# Patient Record
Sex: FEMALE | Race: WHITE | NOT HISPANIC OR LATINO
[De-identification: names, ages, dates, MRNs, and addresses within clinical notes are randomized per-mention and may not be internally consistent; named-entity substitution may affect disease eponyms.]

---

## 2017-09-26 ENCOUNTER — LABORATORY RESULT (OUTPATIENT)
Age: 57
End: 2017-09-26

## 2017-09-26 ENCOUNTER — APPOINTMENT (OUTPATIENT)
Dept: ENDOCRINOLOGY | Facility: CLINIC | Age: 57
End: 2017-09-26
Payer: COMMERCIAL

## 2017-09-26 VITALS
SYSTOLIC BLOOD PRESSURE: 150 MMHG | BODY MASS INDEX: 32.39 KG/M2 | HEIGHT: 60 IN | DIASTOLIC BLOOD PRESSURE: 90 MMHG | WEIGHT: 165 LBS | HEART RATE: 78 BPM

## 2017-09-26 VITALS
SYSTOLIC BLOOD PRESSURE: 140 MMHG | BODY MASS INDEX: 32.39 KG/M2 | WEIGHT: 165 LBS | HEIGHT: 60 IN | HEART RATE: 78 BPM | DIASTOLIC BLOOD PRESSURE: 70 MMHG

## 2017-09-26 PROCEDURE — 99214 OFFICE O/P EST MOD 30 MIN: CPT | Mod: 25

## 2017-09-26 PROCEDURE — 36415 COLL VENOUS BLD VENIPUNCTURE: CPT

## 2017-09-26 RX ORDER — ALBUTEROL SULFATE 108 UG/1
108 (90 BASE) AEROSOL, METERED RESPIRATORY (INHALATION)
Refills: 0 | Status: ACTIVE | COMMUNITY

## 2017-10-02 ENCOUNTER — MOBILE ON CALL (OUTPATIENT)
Age: 57
End: 2017-10-02

## 2017-10-02 LAB
25(OH)D3 SERPL-MCNC: 26.4 NG/ML
ALBUMIN SERPL ELPH-MCNC: 4.6 G/DL
ALP BLD-CCNC: 74 U/L
ALT SERPL-CCNC: 15 U/L
ANA PAT FLD IF-IMP: ABNORMAL
ANA PATTERN: ABNORMAL
ANA SER IF-ACNC: ABNORMAL
ANA TITER: ABNORMAL
ANION GAP SERPL CALC-SCNC: 16 MMOL/L
AST SERPL-CCNC: 17 U/L
BASOPHILS # BLD AUTO: 0.02 K/UL
BASOPHILS NFR BLD AUTO: 0.3 %
BILIRUB SERPL-MCNC: 0.5 MG/DL
BUN SERPL-MCNC: 13 MG/DL
CALCIUM SERPL-MCNC: 9.7 MG/DL
CHLORIDE SERPL-SCNC: 99 MMOL/L
CHOLEST SERPL-MCNC: 240 MG/DL
CHOLEST/HDLC SERPL: 4 RATIO
CO2 SERPL-SCNC: 25 MMOL/L
CORTIS SERPL-MCNC: 3 UG/DL
CREAT SERPL-MCNC: 0.82 MG/DL
EOSINOPHIL # BLD AUTO: 0.08 K/UL
EOSINOPHIL NFR BLD AUTO: 1.1 %
FSH SERPL-MCNC: 76.9 IU/L
GLUCOSE SERPL-MCNC: 92 MG/DL
HBA1C MFR BLD HPLC: 5.2 %
HCT VFR BLD CALC: 43.9 %
HDLC SERPL-MCNC: 60 MG/DL
HGB BLD-MCNC: 14.6 G/DL
IMM GRANULOCYTES NFR BLD AUTO: 0.3 %
INSULIN P FAST SERPL-ACNC: 9.5 UU/ML
LDLC SERPL CALC-MCNC: 147 MG/DL
LH SERPL-ACNC: 42.7 IU/L
LYMPHOCYTES # BLD AUTO: 2.07 K/UL
LYMPHOCYTES NFR BLD AUTO: 27.3 %
MAN DIFF?: NORMAL
MCHC RBC-ENTMCNC: 29.8 PG
MCHC RBC-ENTMCNC: 33.3 GM/DL
MCV RBC AUTO: 89.6 FL
MONOCYTES # BLD AUTO: 0.51 K/UL
MONOCYTES NFR BLD AUTO: 6.7 %
NEUTROPHILS # BLD AUTO: 4.87 K/UL
NEUTROPHILS NFR BLD AUTO: 64.3 %
PLATELET # BLD AUTO: 224 K/UL
POTASSIUM SERPL-SCNC: 4.4 MMOL/L
PROLACTIN SERPL-MCNC: 5.1 NG/ML
PROT SERPL-MCNC: 7.7 G/DL
RBC # BLD: 4.9 M/UL
RBC # FLD: 14 %
SODIUM SERPL-SCNC: 140 MMOL/L
T4 FREE SERPL-MCNC: 1.3 NG/DL
T4 SERPL-MCNC: 8.4 UG/DL
TESTOST BND SERPL-MCNC: <0.2 PG/ML
TESTOST SERPL-MCNC: 7.7 NG/DL
THYROGLOB AB SERPL-ACNC: 112 IU/ML
THYROPEROXIDASE AB SERPL IA-ACNC: 62.7 IU/ML
TRIGL SERPL-MCNC: 166 MG/DL
TSH SERPL-ACNC: 2.84 UIU/ML
WBC # FLD AUTO: 7.57 K/UL

## 2017-10-03 ENCOUNTER — RX RENEWAL (OUTPATIENT)
Age: 57
End: 2017-10-03

## 2018-02-06 ENCOUNTER — APPOINTMENT (OUTPATIENT)
Dept: ENDOCRINOLOGY | Facility: CLINIC | Age: 58
End: 2018-02-06
Payer: COMMERCIAL

## 2018-02-06 VITALS
HEART RATE: 84 BPM | WEIGHT: 172 LBS | DIASTOLIC BLOOD PRESSURE: 80 MMHG | HEIGHT: 59.5 IN | BODY MASS INDEX: 34.22 KG/M2 | SYSTOLIC BLOOD PRESSURE: 140 MMHG

## 2018-02-06 PROCEDURE — 99214 OFFICE O/P EST MOD 30 MIN: CPT

## 2018-03-06 ENCOUNTER — RX RENEWAL (OUTPATIENT)
Age: 58
End: 2018-03-06

## 2018-03-09 ENCOUNTER — RX RENEWAL (OUTPATIENT)
Age: 58
End: 2018-03-09

## 2018-03-16 ENCOUNTER — MEDICATION RENEWAL (OUTPATIENT)
Age: 58
End: 2018-03-16

## 2018-04-24 ENCOUNTER — APPOINTMENT (OUTPATIENT)
Dept: ENDOCRINOLOGY | Facility: CLINIC | Age: 58
End: 2018-04-24
Payer: COMMERCIAL

## 2018-04-24 VITALS
OXYGEN SATURATION: 97 % | BODY MASS INDEX: 34.02 KG/M2 | WEIGHT: 171 LBS | HEIGHT: 59.5 IN | HEART RATE: 78 BPM | SYSTOLIC BLOOD PRESSURE: 122 MMHG | DIASTOLIC BLOOD PRESSURE: 85 MMHG

## 2018-04-24 PROCEDURE — 99214 OFFICE O/P EST MOD 30 MIN: CPT

## 2018-05-22 ENCOUNTER — APPOINTMENT (OUTPATIENT)
Dept: ENDOCRINOLOGY | Facility: CLINIC | Age: 58
End: 2018-05-22
Payer: COMMERCIAL

## 2018-05-22 VITALS
HEART RATE: 68 BPM | WEIGHT: 178.5 LBS | OXYGEN SATURATION: 98 % | SYSTOLIC BLOOD PRESSURE: 133 MMHG | HEIGHT: 59.5 IN | DIASTOLIC BLOOD PRESSURE: 83 MMHG | BODY MASS INDEX: 35.51 KG/M2

## 2018-05-22 PROCEDURE — 99214 OFFICE O/P EST MOD 30 MIN: CPT

## 2018-05-24 ENCOUNTER — APPOINTMENT (OUTPATIENT)
Dept: ENDOCRINOLOGY | Facility: CLINIC | Age: 58
End: 2018-05-24

## 2018-06-15 ENCOUNTER — RX RENEWAL (OUTPATIENT)
Age: 58
End: 2018-06-15

## 2018-06-22 ENCOUNTER — APPOINTMENT (OUTPATIENT)
Dept: ENDOCRINOLOGY | Facility: CLINIC | Age: 58
End: 2018-06-22

## 2018-07-16 ENCOUNTER — APPOINTMENT (OUTPATIENT)
Dept: ENDOCRINOLOGY | Facility: CLINIC | Age: 58
End: 2018-07-16

## 2018-08-02 ENCOUNTER — APPOINTMENT (OUTPATIENT)
Dept: ENDOCRINOLOGY | Facility: CLINIC | Age: 58
End: 2018-08-02
Payer: COMMERCIAL

## 2018-08-02 VITALS
SYSTOLIC BLOOD PRESSURE: 117 MMHG | DIASTOLIC BLOOD PRESSURE: 76 MMHG | HEIGHT: 59.5 IN | OXYGEN SATURATION: 98 % | HEART RATE: 79 BPM | WEIGHT: 160 LBS | BODY MASS INDEX: 31.83 KG/M2

## 2018-08-02 DIAGNOSIS — Z87.891 PERSONAL HISTORY OF NICOTINE DEPENDENCE: ICD-10-CM

## 2018-08-02 DIAGNOSIS — Z78.9 OTHER SPECIFIED HEALTH STATUS: ICD-10-CM

## 2018-08-02 LAB
GLUCOSE BLDC GLUCOMTR-MCNC: 93
HBA1C MFR BLD HPLC: 5.1

## 2018-08-02 PROCEDURE — 36415 COLL VENOUS BLD VENIPUNCTURE: CPT

## 2018-08-02 PROCEDURE — 82962 GLUCOSE BLOOD TEST: CPT

## 2018-08-02 PROCEDURE — 83036 HEMOGLOBIN GLYCOSYLATED A1C: CPT | Mod: QW

## 2018-08-02 PROCEDURE — 99214 OFFICE O/P EST MOD 30 MIN: CPT | Mod: 25

## 2018-08-08 LAB
25(OH)D3 SERPL-MCNC: 29 NG/ML
ALBUMIN SERPL ELPH-MCNC: 4.7 G/DL
ALP BLD-CCNC: 72 U/L
ALT SERPL-CCNC: 12 U/L
ANION GAP SERPL CALC-SCNC: 11 MMOL/L
AST SERPL-CCNC: 16 U/L
BASOPHILS # BLD AUTO: 0.02 K/UL
BASOPHILS NFR BLD AUTO: 0.3 %
BILIRUB SERPL-MCNC: 0.5 MG/DL
BUN SERPL-MCNC: 15 MG/DL
CALCIUM SERPL-MCNC: 9.8 MG/DL
CHLORIDE SERPL-SCNC: 104 MMOL/L
CHOLEST SERPL-MCNC: 197 MG/DL
CHOLEST/HDLC SERPL: 3.6 RATIO
CO2 SERPL-SCNC: 26 MMOL/L
CREAT SERPL-MCNC: 0.9 MG/DL
EOSINOPHIL # BLD AUTO: 0.08 K/UL
EOSINOPHIL NFR BLD AUTO: 1.2 %
GLUCOSE SERPL-MCNC: 86 MG/DL
HCT VFR BLD CALC: 45.3 %
HDLC SERPL-MCNC: 55 MG/DL
HGB BLD-MCNC: 14.9 G/DL
IMM GRANULOCYTES NFR BLD AUTO: 0.3 %
LDLC SERPL CALC-MCNC: 116 MG/DL
LYMPHOCYTES # BLD AUTO: 1.53 K/UL
LYMPHOCYTES NFR BLD AUTO: 22.6 %
MAN DIFF?: NORMAL
MCHC RBC-ENTMCNC: 29 PG
MCHC RBC-ENTMCNC: 32.9 GM/DL
MCV RBC AUTO: 88.3 FL
MONOCYTES # BLD AUTO: 0.42 K/UL
MONOCYTES NFR BLD AUTO: 6.2 %
NEUTROPHILS # BLD AUTO: 4.7 K/UL
NEUTROPHILS NFR BLD AUTO: 69.4 %
PLATELET # BLD AUTO: 231 K/UL
POTASSIUM SERPL-SCNC: 5.4 MMOL/L
PROT SERPL-MCNC: 7.3 G/DL
RBC # BLD: 5.13 M/UL
RBC # FLD: 14.5 %
SAVE SPECIMEN: NORMAL
SODIUM SERPL-SCNC: 141 MMOL/L
T4 FREE SERPL-MCNC: 1.2 NG/DL
T4 SERPL-MCNC: 9.2 UG/DL
TRIGL SERPL-MCNC: 128 MG/DL
TSH SERPL-ACNC: 3.1 UIU/ML
WBC # FLD AUTO: 6.77 K/UL

## 2018-08-24 ENCOUNTER — RX RENEWAL (OUTPATIENT)
Age: 58
End: 2018-08-24

## 2018-11-05 ENCOUNTER — RX RENEWAL (OUTPATIENT)
Age: 58
End: 2018-11-05

## 2018-11-09 ENCOUNTER — MEDICATION RENEWAL (OUTPATIENT)
Age: 58
End: 2018-11-09

## 2018-12-05 ENCOUNTER — APPOINTMENT (OUTPATIENT)
Dept: ENDOCRINOLOGY | Facility: CLINIC | Age: 58
End: 2018-12-05

## 2018-12-10 ENCOUNTER — RX RENEWAL (OUTPATIENT)
Age: 58
End: 2018-12-10

## 2019-01-18 ENCOUNTER — APPOINTMENT (OUTPATIENT)
Dept: ENDOCRINOLOGY | Facility: CLINIC | Age: 59
End: 2019-01-18
Payer: COMMERCIAL

## 2019-01-18 VITALS
DIASTOLIC BLOOD PRESSURE: 85 MMHG | OXYGEN SATURATION: 96 % | WEIGHT: 155 LBS | HEIGHT: 59.5 IN | BODY MASS INDEX: 30.84 KG/M2 | HEART RATE: 76 BPM | SYSTOLIC BLOOD PRESSURE: 150 MMHG

## 2019-01-18 VITALS — SYSTOLIC BLOOD PRESSURE: 132 MMHG | DIASTOLIC BLOOD PRESSURE: 82 MMHG

## 2019-01-18 PROCEDURE — 36415 COLL VENOUS BLD VENIPUNCTURE: CPT

## 2019-01-18 PROCEDURE — 99214 OFFICE O/P EST MOD 30 MIN: CPT | Mod: 25

## 2019-01-18 NOTE — ASSESSMENT
[FreeTextEntry1] : Patient with adult onset obesity. We discussed importance of diet and exercise. At this time, she will increase Saxenda to 3mg daily. We reviewed injection technique and site rotation with the patient. I have explained the importance of proper injection to the patient. She will avoid that area for a few weeks.  We discussed at length the importance of following a carbohydrate balanced diet and the importance of incorporating protein in meals. We also discussed appropriate alternative food choices. We also discussed the importance of exercise.\par She will remain on Synthroid 25 mcg daily. A fasting blood test was done today. Patient is requesting to check her JONNY as she is due to see the Rheumatologist. She will f/u in 3 months.\par \par  [Carbohydrate Consistent Diet] : carbohydrate consistent diet [Importance of Diet and Exercise] : importance of diet and exercise to improve glycemic control, achieve weight loss and improve cardiovascular health [Incretin Mimetic Therapy] : Risks and benefits of incretin mimetic therapy were discussed with the patient including nauseau, pancreatitis and potential risk of medullary thyroid cancer [Levothyroxine] : The patient was instructed to take Levothyroxine on an empty stomach, separate from vitamins, and wait at least 30 minutes before eating

## 2019-01-18 NOTE — HISTORY OF PRESENT ILLNESS
[FreeTextEntry1] : Patient with adult onset obesity. She is on Saxenda 2.4 mg daily. She reports daily compliance with her medication. She does have one abdominal lump from the Saxenda. She has lost 5 lbs since her last visit in August. She has been more sedentary due to an injury with her foot when she fell at home. She does report she is watching her diet but did struggle during the holidays. She is planning to start exercising again. \par Patient with h/o Hashimoto's, she has been taking Levothyroxine 25 mcg daily. She does have a history of thyroid nodules and will be due for a repeat thyroid sono in May. \par \par \par

## 2019-01-18 NOTE — PHYSICAL EXAM
[Alert] : alert [No Acute Distress] : no acute distress [Well Nourished] : well nourished [Well Developed] : well developed [Normal Sclera/Conjunctiva] : normal sclera/conjunctiva [EOMI] : extra ocular movement intact [No Proptosis] : no proptosis [Normal Oropharynx] : the oropharynx was normal [Thyroid Not Enlarged] : the thyroid was not enlarged [No Thyroid Nodules] : there were no palpable thyroid nodules [No Respiratory Distress] : no respiratory distress [No Accessory Muscle Use] : no accessory muscle use [Clear to Auscultation] : lungs were clear to auscultation bilaterally [Normal Rate] : heart rate was normal  [Normal S1, S2] : normal S1 and S2 [Regular Rhythm] : with a regular rhythm [Pedal Pulses Normal] : the pedal pulses are present [No Edema] : there was no peripheral edema [Normal Bowel Sounds] : normal bowel sounds [Not Tender] : non-tender [Soft] : abdomen soft [Not Distended] : not distended [No Spinal Tenderness] : no spinal tenderness [Spine Straight] : spine straight [Normal Gait] : normal gait [Normal Strength/Tone] : muscle strength and tone were normal [No Rash] : no rash [Acanthosis Nigricans] : no acanthosis nigricans [Normal Reflexes] : deep tendon reflexes were 2+ and symmetric [No Tremors] : no tremors [Oriented x3] : oriented to person, place, and time [de-identified] : RLQ marble sized lump

## 2019-01-28 ENCOUNTER — MEDICATION RENEWAL (OUTPATIENT)
Age: 59
End: 2019-01-28

## 2019-01-28 LAB
25(OH)D3 SERPL-MCNC: 21.9 NG/ML
ALBUMIN SERPL ELPH-MCNC: 4.8 G/DL
ALP BLD-CCNC: 79 U/L
ALT SERPL-CCNC: 14 U/L
ANA PAT FLD IF-IMP: ABNORMAL
ANA SER IF-ACNC: ABNORMAL
ANION GAP SERPL CALC-SCNC: 13 MMOL/L
AST SERPL-CCNC: 15 U/L
BASOPHILS # BLD AUTO: 0.02 K/UL
BASOPHILS NFR BLD AUTO: 0.3 %
BILIRUB SERPL-MCNC: 0.4 MG/DL
BUN SERPL-MCNC: 14 MG/DL
CALCIUM SERPL-MCNC: 9.5 MG/DL
CHLORIDE SERPL-SCNC: 104 MMOL/L
CHOLEST SERPL-MCNC: 191 MG/DL
CHOLEST/HDLC SERPL: 2.9 RATIO
CO2 SERPL-SCNC: 24 MMOL/L
CREAT SERPL-MCNC: 0.98 MG/DL
EOSINOPHIL # BLD AUTO: 0.06 K/UL
EOSINOPHIL NFR BLD AUTO: 1 %
GLUCOSE SERPL-MCNC: 94 MG/DL
HCT VFR BLD CALC: 45.5 %
HDLC SERPL-MCNC: 66 MG/DL
HGB BLD-MCNC: 15.2 G/DL
IMM GRANULOCYTES NFR BLD AUTO: 0.3 %
LDLC SERPL CALC-MCNC: 98 MG/DL
LYMPHOCYTES # BLD AUTO: 1.73 K/UL
LYMPHOCYTES NFR BLD AUTO: 29.2 %
MAN DIFF?: NORMAL
MCHC RBC-ENTMCNC: 29.7 PG
MCHC RBC-ENTMCNC: 33.4 GM/DL
MCV RBC AUTO: 88.9 FL
MONOCYTES # BLD AUTO: 0.41 K/UL
MONOCYTES NFR BLD AUTO: 6.9 %
NEUTROPHILS # BLD AUTO: 3.69 K/UL
NEUTROPHILS NFR BLD AUTO: 62.3 %
PLATELET # BLD AUTO: 238 K/UL
POTASSIUM SERPL-SCNC: 4.5 MMOL/L
PROT SERPL-MCNC: 7.6 G/DL
RBC # BLD: 5.12 M/UL
RBC # FLD: 14 %
SAVE SPECIMEN: NORMAL
SODIUM SERPL-SCNC: 141 MMOL/L
T4 FREE SERPL-MCNC: 1.2 NG/DL
T4 SERPL-MCNC: 8.4 UG/DL
TRIGL SERPL-MCNC: 133 MG/DL
TSH SERPL-ACNC: 2.88 UIU/ML
WBC # FLD AUTO: 5.93 K/UL

## 2019-01-28 RX ORDER — PEN NEEDLE, DIABETIC 32 GX 1/6"
32G X 4 MM NEEDLE, DISPOSABLE MISCELLANEOUS
Qty: 100 | Refills: 1 | Status: DISCONTINUED | COMMUNITY
Start: 2018-05-21 | End: 2019-01-28

## 2019-01-28 RX ORDER — PEN NEEDLE, DIABETIC 32 GX 1/4"
32G X 6 MM NEEDLE, DISPOSABLE MISCELLANEOUS
Qty: 100 | Refills: 2 | Status: DISCONTINUED | COMMUNITY
Start: 2018-11-09 | End: 2019-01-28

## 2019-03-05 ENCOUNTER — APPOINTMENT (OUTPATIENT)
Dept: ENDOCRINOLOGY | Facility: CLINIC | Age: 59
End: 2019-03-05
Payer: COMMERCIAL

## 2019-03-05 VITALS
DIASTOLIC BLOOD PRESSURE: 84 MMHG | OXYGEN SATURATION: 94 % | HEIGHT: 59.5 IN | HEART RATE: 73 BPM | BODY MASS INDEX: 31.08 KG/M2 | SYSTOLIC BLOOD PRESSURE: 141 MMHG | WEIGHT: 156.25 LBS

## 2019-03-05 PROCEDURE — 99215 OFFICE O/P EST HI 40 MIN: CPT

## 2019-03-05 NOTE — ASSESSMENT
[FreeTextEntry1] : Patient with a history of Hashimoto's having and is on levothyroxine 25 ug remain on 9 pills weekly.. She is doing great on low dose saxenda. Will check labs today, She will try to incorporate exercise into her daily routine.\par Her BD in 2017 showed significant osteoporosis. Will will do labs to r/o secondary cause of bone loss and then discuss therapy.  [Carbohydrate Consistent Diet] : carbohydrate consistent diet [Importance of Diet and Exercise] : importance of diet and exercise to improve glycemic control, achieve weight loss and improve cardiovascular health [Incretin Mimetic Therapy] : Risks and benefits of incretin mimetic therapy were discussed with the patient including nauseau, pancreatitis and potential risk of medullary thyroid cancer

## 2019-03-05 NOTE — HISTORY OF PRESENT ILLNESS
[FreeTextEntry1] : Patient with a history of Hashimoto's thyroid nodules.  She was originally started on Synthroid and has been compliant recently.Her main problrm has to do with weight gain and fatigue. She has tried many diets and does not seem to lose weight and keep it off. She is on medication for high cholesterol. She does notice some intermittent hoarseness. She recently has been back on levothyroxine and feels better. She has been on 3 mg of saxenda and has been losing weight; she also feels much better.

## 2019-03-05 NOTE — PHYSICAL EXAM
[Alert] : alert [No Acute Distress] : no acute distress [Well Nourished] : well nourished [Well Developed] : well developed [Normal Sclera/Conjunctiva] : normal sclera/conjunctiva [EOMI] : extra ocular movement intact [No Proptosis] : no proptosis [Normal Oropharynx] : the oropharynx was normal [Thyroid Not Enlarged] : the thyroid was not enlarged [No Thyroid Nodules] : there were no palpable thyroid nodules [No Respiratory Distress] : no respiratory distress [No Accessory Muscle Use] : no accessory muscle use [Clear to Auscultation] : lungs were clear to auscultation bilaterally [Normal Rate] : heart rate was normal  [Normal S1, S2] : normal S1 and S2 [Regular Rhythm] : with a regular rhythm [Pedal Pulses Normal] : the pedal pulses are present [No Edema] : there was no peripheral edema [Normal Bowel Sounds] : normal bowel sounds [Not Tender] : non-tender [Soft] : abdomen soft [Not Distended] : not distended [Post Cervical Nodes] : posterior cervical nodes [Anterior Cervical Nodes] : anterior cervical nodes [Axillary Nodes] : axillary nodes [Normal] : normal and non tender [No Spinal Tenderness] : no spinal tenderness [Spine Straight] : spine straight [Normal Gait] : normal gait [Normal Strength/Tone] : muscle strength and tone were normal [No Rash] : no rash [Normal Reflexes] : deep tendon reflexes were 2+ and symmetric [No Tremors] : no tremors [Oriented x3] : oriented to person, place, and time [Acanthosis Nigricans] : no acanthosis nigricans

## 2019-03-06 ENCOUNTER — LABORATORY RESULT (OUTPATIENT)
Age: 59
End: 2019-03-06

## 2019-05-09 ENCOUNTER — APPOINTMENT (OUTPATIENT)
Dept: ENDOCRINOLOGY | Facility: CLINIC | Age: 59
End: 2019-05-09
Payer: COMMERCIAL

## 2019-05-09 VITALS
BODY MASS INDEX: 31.63 KG/M2 | SYSTOLIC BLOOD PRESSURE: 144 MMHG | WEIGHT: 159 LBS | HEART RATE: 74 BPM | HEIGHT: 59.5 IN | DIASTOLIC BLOOD PRESSURE: 79 MMHG | OXYGEN SATURATION: 97 %

## 2019-05-09 PROCEDURE — 99214 OFFICE O/P EST MOD 30 MIN: CPT

## 2019-06-12 NOTE — ASSESSMENT
[Carbohydrate Consistent Diet] : carbohydrate consistent diet [Levothyroxine] : The patient was instructed to take Levothyroxine on an empty stomach, separate from vitamins, and wait at least 30 minutes before eating [Importance of Diet and Exercise] : importance of diet and exercise to improve glycemic control, achieve weight loss and improve cardiovascular health [FreeTextEntry1] : Patient with a history of Hashimoto's having and is on levothyroxine 25 ug remain on 9 pills weekly.. She is doing great on low dose saxenda. Will check labs today, She will try to incorporate exercise into her daily routine.\par \par \par Her BD 3/2019 showed significant osteoporosis T score - 4.3 , high risk for fracture. .Labs were reviewed and were normal. recommend Forteo due to significant osteoporosis. Will send to pharmacy. \par Recommend Forteo for 2 years followed by Reclast. Oral bisphosphonates contraindicated due to pharyngeal dysphagia.

## 2019-06-12 NOTE — PHYSICAL EXAM
[Alert] : alert [No Acute Distress] : no acute distress [Well Nourished] : well nourished [Well Developed] : well developed [Normal Sclera/Conjunctiva] : normal sclera/conjunctiva [EOMI] : extra ocular movement intact [Normal Oropharynx] : the oropharynx was normal [No Proptosis] : no proptosis [Thyroid Not Enlarged] : the thyroid was not enlarged [No Thyroid Nodules] : there were no palpable thyroid nodules [No Respiratory Distress] : no respiratory distress [No Accessory Muscle Use] : no accessory muscle use [Normal Rate] : heart rate was normal  [Clear to Auscultation] : lungs were clear to auscultation bilaterally [Normal S1, S2] : normal S1 and S2 [Regular Rhythm] : with a regular rhythm [No Edema] : there was no peripheral edema [Pedal Pulses Normal] : the pedal pulses are present [Normal Bowel Sounds] : normal bowel sounds [Not Tender] : non-tender [Soft] : abdomen soft [Post Cervical Nodes] : posterior cervical nodes [Not Distended] : not distended [Anterior Cervical Nodes] : anterior cervical nodes [Axillary Nodes] : axillary nodes [Normal] : normal and non tender [Spine Straight] : spine straight [No Spinal Tenderness] : no spinal tenderness [Normal Gait] : normal gait [No Rash] : no rash [Normal Strength/Tone] : muscle strength and tone were normal [Normal Reflexes] : deep tendon reflexes were 2+ and symmetric [No Tremors] : no tremors [Oriented x3] : oriented to person, place, and time [Acanthosis Nigricans] : no acanthosis nigricans

## 2019-06-12 NOTE — HISTORY OF PRESENT ILLNESS
[FreeTextEntry1] : Patient with a history of Hashimoto's thyroid nodules.  She was originally started on Synthroid and has been compliant recently.Her main problem has to do with weight gain and fatigue. She has tried many diets and does not seem to lose weight and keep it off. She is on medication for high cholesterol. She does notice some intermittent hoarseness. She recently has been back on levothyroxine and feels better. She has been on 3 mg of saxenda and has been losing weight; she also feels much better. She is here to go over recent lab work for osteoporosis.

## 2019-07-29 ENCOUNTER — RX RENEWAL (OUTPATIENT)
Age: 59
End: 2019-07-29

## 2019-10-02 PROBLEM — Z78.9 PATIENT CONSUMER CAFFEINATED COFFEE: Status: ACTIVE | Noted: 2017-09-26

## 2019-10-19 ENCOUNTER — MOBILE ON CALL (OUTPATIENT)
Age: 59
End: 2019-10-19

## 2019-11-07 ENCOUNTER — APPOINTMENT (OUTPATIENT)
Dept: PEDIATRICS | Facility: CLINIC | Age: 59
End: 2019-11-07
Payer: SELF-PAY

## 2019-11-07 PROCEDURE — 90686 IIV4 VACC NO PRSV 0.5 ML IM: CPT | Mod: SL

## 2019-11-07 PROCEDURE — 90471 IMMUNIZATION ADMIN: CPT

## 2019-11-25 ENCOUNTER — RX RENEWAL (OUTPATIENT)
Age: 59
End: 2019-11-25

## 2019-12-09 LAB
25(OH)D3 SERPL-MCNC: 30.7 NG/ML
ALBUMIN SERPL ELPH-MCNC: 4.7 G/DL
ALP BLD-CCNC: 88 U/L
ALP BONE SERPL-MCNC: 14 MCG/L
ALT SERPL-CCNC: 11 U/L
ANA PAT FLD IF-IMP: ABNORMAL
ANA SER IF-ACNC: ABNORMAL
ANION GAP SERPL CALC-SCNC: 13 MMOL/L
AST SERPL-CCNC: 16 U/L
BASOPHILS # BLD AUTO: 0.04 K/UL
BASOPHILS NFR BLD AUTO: 0.6 %
BILIRUB SERPL-MCNC: 0.5 MG/DL
BUN SERPL-MCNC: 14 MG/DL
CALCIUM SERPL-MCNC: 9.8 MG/DL
CALCIUM SERPL-MCNC: 9.8 MG/DL
CHLORIDE SERPL-SCNC: 100 MMOL/L
CHOLEST SERPL-MCNC: 213 MG/DL
CHOLEST/HDLC SERPL: 3.3 RATIO
CO2 SERPL-SCNC: 25 MMOL/L
COLLAGEN CTX SERPL-MCNC: 588 PG/ML
CREAT SERPL-MCNC: 0.91 MG/DL
DEPRECATED KAPPA LC FREE/LAMBDA SER: 1.5 RATIO
DEPRECATED KAPPA LC FREE/LAMBDA SER: 1.5 RATIO
EOSINOPHIL # BLD AUTO: 0.11 K/UL
EOSINOPHIL NFR BLD AUTO: 1.7 %
ESTIMATED AVERAGE GLUCOSE: 105 MG/DL
GLUCOSE SERPL-MCNC: 100 MG/DL
HBA1C MFR BLD HPLC: 5.3 %
HCT VFR BLD CALC: 46.2 %
HDLC SERPL-MCNC: 64 MG/DL
HGB BLD-MCNC: 15.8 G/DL
IGA SER QL IEP: 210 MG/DL
IGG SER QL IEP: 1350 MG/DL
IGM SER QL IEP: 107 MG/DL
IMM GRANULOCYTES NFR BLD AUTO: 0.3 %
KAPPA LC CSF-MCNC: 1.52 MG/DL
KAPPA LC CSF-MCNC: 1.52 MG/DL
KAPPA LC SERPL-MCNC: 2.28 MG/DL
KAPPA LC SERPL-MCNC: 2.28 MG/DL
LDLC SERPL CALC-MCNC: 118 MG/DL
LYMPHOCYTES # BLD AUTO: 1.96 K/UL
LYMPHOCYTES NFR BLD AUTO: 30.3 %
M PROTEIN SPEC IFE-MCNC: NORMAL
MAN DIFF?: NORMAL
MCHC RBC-ENTMCNC: 30.3 PG
MCHC RBC-ENTMCNC: 34.2 GM/DL
MCV RBC AUTO: 88.7 FL
MONOCYTES # BLD AUTO: 0.57 K/UL
MONOCYTES NFR BLD AUTO: 8.8 %
NEUTROPHILS # BLD AUTO: 3.76 K/UL
NEUTROPHILS NFR BLD AUTO: 58.3 %
PARATHYROID HORMONE INTACT: 69 PG/ML
PLATELET # BLD AUTO: 239 K/UL
POTASSIUM SERPL-SCNC: 4.3 MMOL/L
PROT SERPL-MCNC: 7.6 G/DL
RBC # BLD: 5.21 M/UL
RBC # FLD: 13.1 %
SAVE SPECIMEN: NORMAL
SODIUM SERPL-SCNC: 138 MMOL/L
T4 FREE SERPL-MCNC: 1.2 NG/DL
T4 SERPL-MCNC: 9.2 UG/DL
TRIGL SERPL-MCNC: 156 MG/DL
TSH SERPL-ACNC: 3.77 UIU/ML
WBC # FLD AUTO: 6.46 K/UL

## 2019-12-12 ENCOUNTER — APPOINTMENT (OUTPATIENT)
Dept: ENDOCRINOLOGY | Facility: CLINIC | Age: 59
End: 2019-12-12
Payer: COMMERCIAL

## 2019-12-12 PROCEDURE — 99215 OFFICE O/P EST HI 40 MIN: CPT | Mod: 25

## 2019-12-12 PROCEDURE — 36415 COLL VENOUS BLD VENIPUNCTURE: CPT

## 2019-12-12 NOTE — ASSESSMENT
[FreeTextEntry1] : 1) Hashimoto's - Patient with a history of Hashimoto's currently off  levothyroxine 25 ug .Since TFTs normal she will remain off. .\par 2) osteoporosis - Her BD 3/2019 showed significant osteoporosis T score - 4.3 , high risk for fracture. .Labs were reviewed and were normal. recommend Forteo due to significant osteoporosis. Recently approved however current labs show elevated PTH level; will repeat prior to starting.  \par Hope to start Forteo for 2 years followed by Reclast. Oral bisphosphonates contraindicated due to pharyngeal dysphagia. \par 3) Elevated kappa level - minimal elevation however her GI doctor mentioned to her ,  her spleen was enlarged; she will make appointment with hematologist/oncologist. Bring copy of report. \par 4 + JONNY to see rheumatologist \par 5) Obesity - She was doing great on low dose saxenda so she recently resumed. 0.6 daily.  [Importance of Diet and Exercise] : importance of diet and exercise to improve glycemic control, achieve weight loss and improve cardiovascular health [Incretin Mimetic Therapy] : Risks and benefits of incretin mimetic therapy were discussed with the patient including nauseau, pancreatitis and potential risk of medullary thyroid cancer [Carbohydrate Consistent Diet] : carbohydrate consistent diet

## 2019-12-12 NOTE — HISTORY OF PRESENT ILLNESS
[FreeTextEntry1] : Patient with a history of Hashimoto's thyroid nodules.  She was originally started on Synthroid and has been compliant recently.Her main problem has to do with weight gain and fatigue. She has tried many diets and does not seem to lose weight and keep it off. She recently resumed Saxenda. She is on medication for high cholesterol. She does notice some intermittent hoarseness. She recently has been back on levothyroxine and felt better but did stop 6 weeks ago. .  She does have a + JONNY but has not seen the rheumatologist. \par  She is here to go over recent lab work for osteoporosis. She was suppose to have started Forteo.

## 2019-12-16 ENCOUNTER — TRANSCRIPTION ENCOUNTER (OUTPATIENT)
Age: 59
End: 2019-12-16

## 2019-12-16 ENCOUNTER — RX RENEWAL (OUTPATIENT)
Age: 59
End: 2019-12-16

## 2019-12-17 LAB
24R-OH-CALCIDIOL SERPL-MCNC: 71.2 PG/ML
CA-I SERPL-SCNC: 1.23 MMOL/L
CALCIUM SERPL-MCNC: 10 MG/DL
CORTIS SERPL-MCNC: 2.2 UG/DL
PARATHYROID HORMONE INTACT: 55 PG/ML
SAVE SPECIMEN: NORMAL

## 2019-12-19 LAB
CORTICOSTEROID BIND GLOBULIN: 1.2 MG/DL
CORTIS SERPL-MCNC: 2 UG/DL
CORTISOL, FREE: 0.11 UG/DL
PFCX: 5.4 %

## 2020-04-29 ENCOUNTER — RX RENEWAL (OUTPATIENT)
Age: 60
End: 2020-04-29

## 2020-08-10 ENCOUNTER — RX RENEWAL (OUTPATIENT)
Age: 60
End: 2020-08-10

## 2020-09-03 ENCOUNTER — APPOINTMENT (OUTPATIENT)
Dept: ENDOCRINOLOGY | Facility: CLINIC | Age: 60
End: 2020-09-03
Payer: COMMERCIAL

## 2020-09-03 PROCEDURE — 99214 OFFICE O/P EST MOD 30 MIN: CPT | Mod: 95

## 2020-09-03 NOTE — PHYSICAL EXAM
[No Acute Distress] : no acute distress [Alert] : alert [Normal Voice/Communication] : normal voice communication [EOMI] : extra ocular movement intact [Thyroid Not Enlarged] : the thyroid was not enlarged [Normal Sclera/Conjunctiva] : normal sclera/conjunctiva [No Respiratory Distress] : no respiratory distress [No Joint Swelling] : no joint swelling seen [Normal Gait] : normal gait [Oriented x3] : oriented to person, place, and time [Normal Insight/Judgement] : insight and judgment were intact [de-identified] : Limited exam due to Telehealth visit secondary to Covid pandemic

## 2020-09-03 NOTE — REASON FOR VISIT
[Follow - Up] : a follow-up visit [Osteoporosis] : osteoporosis [Weight Management/Obesity] : weight management/obesity

## 2020-09-03 NOTE — HISTORY OF PRESENT ILLNESS
[Home] : at home, [unfilled] , at the time of the visit. [Medical Office: ([facility])___] : at the medical office located in  [Verbal consent obtained from patient] : the patient, [unfilled] [FreeTextEntry1] : Patient with a history of Hashimoto's thyroid nodules.  She was originally started on Synthroid and has been compliant recently.Her main problem has to do with weight gain and fatigue. She has tried many diets and does not seem to lose weight and keep it off. She has been off Synthroid over a year. She recently resumed Saxenda. Was off a few months. She is on medication for high cholesterol. She does notice some intermittent hoarseness.  She has a + JONNY but has not seen the rheumatologist. \par  She has  osteoporosis. She was suppose to have started Forteo but worried to start during the pandemic. she rarely goes out. .

## 2020-09-03 NOTE — ASSESSMENT
[Weight Loss] : weight loss [Importance of Diet and Exercise] : importance of diet and exercise to improve glycemic control, achieve weight loss and improve cardiovascular health [FreeTextEntry1] : 1) Hashimoto's - Patient with a history of Hashimoto's currently off  levothyroxine 25 ug .Since TFTs normal she will remain off. .to repeat labs. Has thyroid nodules due to repeat a sono. \par 2) osteoporosis - Her BD 3/2019 showed significant osteoporosis T score - 4.3 , high risk for fracture. .Labs were reviewed and were normal. recommend Forteo due to significant osteoporosis. Recently approved however .  \par Hope to start Forteo for 2 years followed by Reclast. Oral bisphosphonates contraindicated due to pharyngeal dysphagia. To repeat labs. \par 3) Elevated kappa level - minimal elevation however her GI doctor mentioned to her ,  her spleen was enlarged; she will make appointment with hematologist/oncologist. Bring copy of report. \par 4 + JONNY to see rheumatologist \par 5) Obesity - She was doing great on low dose Saxenda so she recently resumed. 0.6 daily. Will order.

## 2021-05-18 ENCOUNTER — NON-APPOINTMENT (OUTPATIENT)
Age: 61
End: 2021-05-18

## 2021-05-22 ENCOUNTER — LABORATORY RESULT (OUTPATIENT)
Age: 61
End: 2021-05-22

## 2021-06-01 ENCOUNTER — RESULT REVIEW (OUTPATIENT)
Age: 61
End: 2021-06-01

## 2021-06-03 ENCOUNTER — APPOINTMENT (OUTPATIENT)
Dept: ENDOCRINOLOGY | Facility: CLINIC | Age: 61
End: 2021-06-03
Payer: COMMERCIAL

## 2021-06-03 VITALS
SYSTOLIC BLOOD PRESSURE: 138 MMHG | HEIGHT: 59.5 IN | HEART RATE: 94 BPM | TEMPERATURE: 97.5 F | DIASTOLIC BLOOD PRESSURE: 76 MMHG | OXYGEN SATURATION: 96 %

## 2021-06-03 PROCEDURE — 99215 OFFICE O/P EST HI 40 MIN: CPT

## 2021-06-03 PROCEDURE — 99072 ADDL SUPL MATRL&STAF TM PHE: CPT

## 2021-06-03 NOTE — PHYSICAL EXAM
[Alert] : alert [No Acute Distress] : no acute distress [Normal Voice/Communication] : normal voice communication [Normal Sclera/Conjunctiva] : normal sclera/conjunctiva [EOMI] : extra ocular movement intact [Normal Outer Ear/Nose] : the ears and nose were normal in appearance [Thyroid Not Enlarged] : the thyroid was not enlarged [No Thyroid Nodules] : no palpable thyroid nodules [No Respiratory Distress] : no respiratory distress [Clear to Auscultation] : lungs were clear to auscultation bilaterally [Normal PMI] : the apical impulse was normal [Normal Rate] : heart rate was normal [Regular Rhythm] : with a regular rhythm [No Edema] : no peripheral edema [Normal Appearance] : normal in appearance [Gynecomastia] : gynecomastia present [Normal Bowel Sounds] : normal bowel sounds [Soft] : abdomen soft [Normal Gait] : normal gait [No Joint Swelling] : no joint swelling seen [No Rash] : no rash [Normal Reflexes] : deep tendon reflexes were 2+ and symmetric [No Tremors] : no tremors [Oriented x3] : oriented to person, place, and time [Normal Insight/Judgement] : insight and judgment were intact [de-identified] : fullness around right knee and thighs seem sarmiento

## 2021-06-03 NOTE — HISTORY OF PRESENT ILLNESS
[FreeTextEntry1] : Patient with a history of Hashimoto's thyroid nodules.  Her main problem has to do with weight gain and fatigue. She has tried many diets and does not seem to lose weight and keep it off. She has been off Synthroid  a few years. . She recently resumed Saxenda but stopped while having some medical issues recently. .She is on medication for high cholesterol. She does notice some intermittent hoarseness.  She has a + JONNY saw a  rheumatologist in the past.. \par  She has  osteoporosis. She was suppose to have started Forteo but worried to start during the pandemic. she rarely goes out. .\par She has not see heme/Onc yet for elevated kappa level.  \par She recently has been experiencing pain in her right knee and was found to have a bakers cyst. Also feel more swollen and was told has lymphedema. \par She did have the Covid vaccine.

## 2021-06-03 NOTE — ASSESSMENT
[FreeTextEntry1] : 1) Hashimoto's - Patient with a history of Hashimoto's currently off  levothyroxine 25 ug .Since TFTs normal she will remain off. .to repeat labs. Has thyroid nodules due to repeat a sono. \par 2) osteoporosis - Her BD 3/2019 showed significant osteoporosis T score - 4.3 , high risk for fracture. .Labs were reviewed and were normal. recommend Forteo due to significant osteoporosis. Recently approved however .  \par Hope to start Forteo for 2 years followed by Reclast. Oral bisphosphonates contraindicated due to pharyngeal dysphagia. Reordered Forteo. . Due for a BD. . \par 3) Elevated kappa level - minimal elevation however her GI doctor mentioned to her ,  her spleen was enlarged; she will make appointment with hematologist/oncologist. Bring copy of report. \par 4 + JONNY to see rheumatologist \par 5) Obesity - She was doing great on low dose Saxenda so she will  resume.6 daily.  [Carbohydrate Consistent Diet] : carbohydrate consistent diet [Teriparatide/Abaloparatide Therapy] : Risks and benefits of Teriparatide/Abaloparatide therapy were discussed with the patient including potential risk of osteosarcoma

## 2021-08-03 ENCOUNTER — APPOINTMENT (OUTPATIENT)
Dept: ENDOCRINOLOGY | Facility: CLINIC | Age: 61
End: 2021-08-03
Payer: COMMERCIAL

## 2021-08-03 VITALS
WEIGHT: 159.5 LBS | BODY MASS INDEX: 32.16 KG/M2 | SYSTOLIC BLOOD PRESSURE: 138 MMHG | OXYGEN SATURATION: 96 % | HEART RATE: 80 BPM | HEIGHT: 59 IN | DIASTOLIC BLOOD PRESSURE: 80 MMHG

## 2021-08-03 PROCEDURE — 99214 OFFICE O/P EST MOD 30 MIN: CPT

## 2021-08-03 RX ORDER — TERIPARATIDE 250 UG/ML
620 INJECTION, SOLUTION SUBCUTANEOUS
Qty: 2 | Refills: 3 | Status: DISCONTINUED | COMMUNITY
Start: 2021-06-03 | End: 2021-08-03

## 2021-08-03 RX ORDER — TERIPARATIDE 250 UG/ML
600 INJECTION, SOLUTION SUBCUTANEOUS
Qty: 3 | Refills: 1 | Status: DISCONTINUED | OUTPATIENT
Start: 2019-05-09 | End: 2021-08-03

## 2021-08-03 NOTE — HISTORY OF PRESENT ILLNESS
[FreeTextEntry1] : Patient with a history of Hashimoto's thyroid nodules.  Her main problem has to do with weight gain and fatigue. She has tried many diets and does not seem to lose weight and keep it off. She has been off Synthroid  a few years. . She recently resumed Saxenda but stopped while having some medical issues recently. .She is on medication for high cholesterol. She does notice some intermittent hoarseness.  She has a + JONNY saw a  rheumatologist in the past.. \par  She has  osteoporosis. She was suppose to have started Forteo but worried to start during the pandemic. she rarely goes out. .\par She has  seen heme/Onc yet for elevated kappa level and dx MGUS. To do a bone survey. .  \par She recently has been experiencing pain in her right knee and was found to have a bakers cyst. Also feel more swollen and was told has lymphedema. \par She did have the Covid vaccine.

## 2021-08-03 NOTE — REASON FOR VISIT
[Follow - Up] : a follow-up visit [Hypothyroidism] : hypothyroidism [Osteoporosis] : osteoporosis [Weight Management/Obesity] : weight management/obesity

## 2021-08-03 NOTE — PHYSICAL EXAM
[Alert] : alert [No Acute Distress] : no acute distress [Normal Voice/Communication] : normal voice communication [Normal Sclera/Conjunctiva] : normal sclera/conjunctiva [EOMI] : extra ocular movement intact [Normal Outer Ear/Nose] : the ears and nose were normal in appearance [Thyroid Not Enlarged] : the thyroid was not enlarged [No Thyroid Nodules] : no palpable thyroid nodules [No Respiratory Distress] : no respiratory distress [Clear to Auscultation] : lungs were clear to auscultation bilaterally [Normal PMI] : the apical impulse was normal [Normal Rate] : heart rate was normal [Regular Rhythm] : with a regular rhythm [No Edema] : no peripheral edema [Normal Appearance] : normal in appearance [Gynecomastia] : gynecomastia present [Normal Bowel Sounds] : normal bowel sounds [Soft] : abdomen soft [Normal Gait] : normal gait [No Joint Swelling] : no joint swelling seen [No Rash] : no rash [Normal Reflexes] : deep tendon reflexes were 2+ and symmetric [No Tremors] : no tremors [Oriented x3] : oriented to person, place, and time [Normal Insight/Judgement] : insight and judgment were intact [de-identified] : fullness around right knee and thighs seem sarmiento

## 2021-08-03 NOTE — ASSESSMENT
[Carbohydrate Consistent Diet] : carbohydrate consistent diet [Importance of Diet and Exercise] : importance of diet and exercise to improve glycemic control, achieve weight loss and improve cardiovascular health [FreeTextEntry1] : 1) Hashimoto's - Patient with a history of Hashimoto's currently off  levothyroxine 25 ug .Since TFTs normal she will remain off. .to repeat labs. Has thyroid nodules repeat one year; largest 1.1 cm non suspicious. . \par 2) osteoporosis - Her BD 3/2019 showed significant osteoporosis T score - 4.5 L 4  , high risk for fracture. .Labs were reviewed and were normal. recommended in past  Forteo due to significant osteoporosis. Was  approved however she did not start. Also discussed Evenity which would be a good option followed by BSP or Prolia.\par recent BD is fairly stable \par 3) Elevated kappa level - minimal elevation however her GI doctor mentioned to her ,  her spleen was enlarged; she will make appointment with hematologist/oncologist. Bring copy of report. \par 4 + JONNY to see rheumatologist \par 5) Obesity - She was doing great on low dose Saxenda so she will  resume.6 daily. \par f/u 2 mos

## 2021-10-18 ENCOUNTER — APPOINTMENT (OUTPATIENT)
Dept: ENDOCRINOLOGY | Facility: CLINIC | Age: 61
End: 2021-10-18
Payer: COMMERCIAL

## 2021-10-18 VITALS
WEIGHT: 149 LBS | SYSTOLIC BLOOD PRESSURE: 147 MMHG | HEIGHT: 59 IN | BODY MASS INDEX: 30.04 KG/M2 | HEART RATE: 78 BPM | OXYGEN SATURATION: 97 % | DIASTOLIC BLOOD PRESSURE: 84 MMHG

## 2021-10-18 PROCEDURE — 36415 COLL VENOUS BLD VENIPUNCTURE: CPT

## 2021-10-18 PROCEDURE — 99214 OFFICE O/P EST MOD 30 MIN: CPT | Mod: 25

## 2021-10-18 RX ORDER — HYDROCHLOROTHIAZIDE 12.5 MG/1
12.5 TABLET ORAL
Qty: 30 | Refills: 0 | Status: ACTIVE | COMMUNITY
Start: 2021-09-29

## 2021-10-18 RX ORDER — LOSARTAN POTASSIUM 25 MG/1
25 TABLET, FILM COATED ORAL
Qty: 90 | Refills: 0 | Status: ACTIVE | COMMUNITY
Start: 2021-09-29

## 2021-10-18 RX ORDER — ROSUVASTATIN CALCIUM 20 MG/1
20 TABLET, FILM COATED ORAL
Qty: 90 | Refills: 0 | Status: ACTIVE | COMMUNITY
Start: 2020-10-29

## 2021-10-18 RX ORDER — CYCLOBENZAPRINE HYDROCHLORIDE 5 MG/1
5 TABLET, FILM COATED ORAL
Qty: 30 | Refills: 0 | Status: ACTIVE | COMMUNITY
Start: 2021-09-27

## 2021-10-18 RX ORDER — FUROSEMIDE 40 MG/1
40 TABLET ORAL
Qty: 7 | Refills: 0 | Status: ACTIVE | COMMUNITY
Start: 2021-05-20

## 2021-10-18 NOTE — HISTORY OF PRESENT ILLNESS
[FreeTextEntry1] : Patient with a history of Hashimoto's thyroid nodules.  Her main problem has to do with weight gain and fatigue. She has tried many diets and does not seem to lose weight and keep it off. She has been off Synthroid  a few years. . She recently resumed Saxenda but stopped while having some medical issues recently. .She is on medication for high cholesterol. She does notice some intermittent hoarseness.  She has a + JONNY saw a  rheumatologist in the past.. \par  She has  osteoporosis. She was suppose to have started Forteo but worried to start during the pandemic. she rarely goes out. .\par She has  seen heme/Onc yet for elevated kappa level and dx MGUS. Followed by heme. . .  \par She recently has been experiencing pain in her right knee and was found to have a bakers cyst. Also feel more swollen and was told has lymphedema. \par She did have the Covid vaccine.

## 2021-10-18 NOTE — ASSESSMENT
[FreeTextEntry1] : 1) Hashimoto's - Patient with a history of Hashimoto's currently off  levothyroxine 25 ug .Since TFTs normal she will remain off. .to repeat labs. Has thyroid nodules repeat one year; largest 1.1 cm non suspicious. . \par 2) osteoporosis - Her BD 3/2019 showed significant osteoporosis T score - 4.5 L 4  , high risk for fracture. .Labs were reviewed and were normal. recommended in past  Forteo due to significant osteoporosis. Was  approved however she did not start. Also discussed Evenity which would be a good option followed by BSP or Prolia.\par recent BD is fairly stable She needs dental extraction will do prior to starting Prolia which she feels most comfortable with. Will check immune status to Covid and V Zoster, said never had CP and had weak response to Vaccine for CP. Wanted to know if should have shingles vaccine. \par 3) Elevated kappa level - minimal elevation however her GI doctor mentioned to her ,  her spleen was enlarged; she will make appointment with hematologist/oncologist. Bring copy of report. \par 4 + JONNY to see rheumatologist \par 5) Obesity - She was doing great on low dose Saxenda so she will  resume..6 daily. \par f/u 3 mos  [Carbohydrate Consistent Diet] : carbohydrate consistent diet [Importance of Diet and Exercise] : importance of diet and exercise to improve glycemic control, achieve weight loss and improve cardiovascular health

## 2021-10-18 NOTE — PHYSICAL EXAM
[Alert] : alert [No Acute Distress] : no acute distress [Normal Voice/Communication] : normal voice communication [Normal Sclera/Conjunctiva] : normal sclera/conjunctiva [EOMI] : extra ocular movement intact [Normal Outer Ear/Nose] : the ears and nose were normal in appearance [Thyroid Not Enlarged] : the thyroid was not enlarged [No Thyroid Nodules] : no palpable thyroid nodules [No Respiratory Distress] : no respiratory distress [Clear to Auscultation] : lungs were clear to auscultation bilaterally [Normal PMI] : the apical impulse was normal [Normal Rate] : heart rate was normal [Regular Rhythm] : with a regular rhythm [No Edema] : no peripheral edema [Normal Appearance] : normal in appearance [Gynecomastia] : gynecomastia present [Normal Bowel Sounds] : normal bowel sounds [Soft] : abdomen soft [Normal Gait] : normal gait [No Joint Swelling] : no joint swelling seen [No Rash] : no rash [Normal Reflexes] : deep tendon reflexes were 2+ and symmetric [No Tremors] : no tremors [Oriented x3] : oriented to person, place, and time [Normal Insight/Judgement] : insight and judgment were intact [de-identified] : fullness around right knee and thighs seem sarmiento

## 2021-10-19 LAB
25(OH)D3 SERPL-MCNC: 34.4 NG/ML
BASOPHILS # BLD AUTO: 0.04 K/UL
BASOPHILS NFR BLD AUTO: 0.7 %
EOSINOPHIL # BLD AUTO: 0.09 K/UL
EOSINOPHIL NFR BLD AUTO: 1.5 %
ESTIMATED AVERAGE GLUCOSE: 103 MG/DL
HBA1C MFR BLD HPLC: 5.2 %
HCT VFR BLD CALC: 41.9 %
HGB BLD-MCNC: 14.3 G/DL
IMM GRANULOCYTES NFR BLD AUTO: 0.3 %
LYMPHOCYTES # BLD AUTO: 1.69 K/UL
LYMPHOCYTES NFR BLD AUTO: 28.5 %
MAN DIFF?: NORMAL
MCHC RBC-ENTMCNC: 30.9 PG
MCHC RBC-ENTMCNC: 34.1 GM/DL
MCV RBC AUTO: 90.5 FL
MONOCYTES # BLD AUTO: 0.44 K/UL
MONOCYTES NFR BLD AUTO: 7.4 %
NEUTROPHILS # BLD AUTO: 3.65 K/UL
NEUTROPHILS NFR BLD AUTO: 61.6 %
PLATELET # BLD AUTO: 244 K/UL
RBC # BLD: 4.63 M/UL
RBC # FLD: 14.4 %
T4 FREE SERPL-MCNC: 1.2 NG/DL
T4 SERPL-MCNC: 9.8 UG/DL
TSH SERPL-ACNC: 3.56 UIU/ML
VZV AB TITR SER: NEGATIVE
VZV IGG SER IF-ACNC: <10 INDEX
WBC # FLD AUTO: 5.93 K/UL

## 2021-10-20 LAB
ALBUMIN SERPL ELPH-MCNC: 4.1 G/DL
ALP BLD-CCNC: 83 U/L
ALT SERPL-CCNC: 7 U/L
ANION GAP SERPL CALC-SCNC: 16 MMOL/L
AST SERPL-CCNC: 13 U/L
BILIRUB SERPL-MCNC: 0.4 MG/DL
BUN SERPL-MCNC: 15 MG/DL
CALCIUM SERPL-MCNC: 9.9 MG/DL
CHLORIDE SERPL-SCNC: 103 MMOL/L
CHOLEST SERPL-MCNC: 211 MG/DL
CO2 SERPL-SCNC: 22 MMOL/L
CREAT SERPL-MCNC: 1 MG/DL
FOLATE SERPL-MCNC: 2.6 NG/ML
GLUCOSE SERPL-MCNC: 109 MG/DL
HDLC SERPL-MCNC: 55 MG/DL
LDLC SERPL CALC-MCNC: 120 MG/DL
MAGNESIUM SERPL-MCNC: 2.2 MG/DL
NONHDLC SERPL-MCNC: 157 MG/DL
POTASSIUM SERPL-SCNC: 4.9 MMOL/L
PROT SERPL-MCNC: 6.6 G/DL
SODIUM SERPL-SCNC: 141 MMOL/L
TRIGL SERPL-MCNC: 181 MG/DL
VIT B12 SERPL-MCNC: 185 PG/ML

## 2021-11-01 ENCOUNTER — NON-APPOINTMENT (OUTPATIENT)
Age: 61
End: 2021-11-01

## 2021-11-01 LAB
COLLAGEN CTX SERPL-MCNC: 420 PG/ML
COVID-19 SPIKE DOMAIN ANTIBODY INTERPRETATION: POSITIVE
SARS-COV-2 AB SERPL IA-ACNC: >250 U/ML

## 2022-02-10 ENCOUNTER — APPOINTMENT (OUTPATIENT)
Dept: ENDOCRINOLOGY | Facility: CLINIC | Age: 62
End: 2022-02-10

## 2022-04-21 ENCOUNTER — APPOINTMENT (OUTPATIENT)
Dept: ORTHOPEDIC SURGERY | Facility: CLINIC | Age: 62
End: 2022-04-21

## 2022-05-12 ENCOUNTER — APPOINTMENT (OUTPATIENT)
Dept: ORTHOPEDIC SURGERY | Facility: CLINIC | Age: 62
End: 2022-05-12
Payer: COMMERCIAL

## 2022-05-12 PROCEDURE — 99212 OFFICE O/P EST SF 10 MIN: CPT

## 2022-05-12 NOTE — IMAGING
[de-identified] : Constitutional: The patient appears well developed, well nourished. Examination of patients ability to communicate\par functionally was normal. \par Right Knee: Inspection of the knee is as follows: mild effusion. no ecchymosis. she has 1-2+ pedal edema\par tibia Palpation of the knee is as follows: medial joint line tenderness, anterior tenderness, pes anserinus\par tenderness, crepitus and patellofemoral tenderness. no tenderness. Knee Range of Motion is as\par follows: Extension: 0 degrees. Flexion: 125 degrees. Anterior pain with flexion. Strength examination of the knee\par is as follows: Quadriceps strength is 5/5 Hamstring strength is 5/5 Ligament Stability and Special Test ligamentously\par stable. Neurological examination of the knee is as follows: light touch is intact throughout. Gait and function is as\par follows: patient ambulates without assistive device and mildly antalgic gait. \par Left Knee: Inspection of the knee is as follows: no effusion, erythema, ecchymosis, scars or deformities. Palpation of\par the knee is as follows: medial joint line tenderness and pes anserinus tenderness. Knee Range of Motion is as\par follows: Flexion: 120 degrees. Range of motion 0 degrees in extension. Strength examination of the knee is as\par follows: Quadriceps strength is 5/5 Hamstring strength is 5/5 Ligament Stability and Special Test ligamentously\par stable. negative McMurrays test. Neurological examination of the knee is as follows: light touch is intact\par throughout. Gait and function is as follows: mildly antalgic gait.

## 2022-05-12 NOTE — ASSESSMENT
[FreeTextEntry1] : BILAT KNEE OA. LT KNEE WITH SCE AND EFFUSION. \par SHE STATES IN THE PAST CSI HAVE NOT HELPED HER ISSUES SHE WOULD LIKE TO AVOID DISCUSSED THE ROLE OF ICE NSAIDS AND THERAPY. REVIEWED THE XRAYS ARE STABLE. REVIEWD MRI RIGHT KNEE WITH MEDIAL SCE. BILAT KNEE STILL WITH PAIN AND SWELLING. TREATMENT OPTIONS AGAIN REVIEWED. WILL REPEAT BILAT KNEE CSI TODAY.\par

## 2022-06-28 ENCOUNTER — APPOINTMENT (OUTPATIENT)
Dept: ENDOCRINOLOGY | Facility: CLINIC | Age: 62
End: 2022-06-28
Payer: COMMERCIAL

## 2022-06-28 VITALS
WEIGHT: 155 LBS | HEIGHT: 59 IN | SYSTOLIC BLOOD PRESSURE: 135 MMHG | BODY MASS INDEX: 31.25 KG/M2 | DIASTOLIC BLOOD PRESSURE: 78 MMHG | OXYGEN SATURATION: 93 % | HEART RATE: 83 BPM

## 2022-06-28 DIAGNOSIS — Z23 ENCOUNTER FOR IMMUNIZATION: ICD-10-CM

## 2022-06-28 PROCEDURE — 36415 COLL VENOUS BLD VENIPUNCTURE: CPT

## 2022-06-28 PROCEDURE — 99214 OFFICE O/P EST MOD 30 MIN: CPT | Mod: 25

## 2022-06-28 RX ORDER — PEN NEEDLE, DIABETIC 32 GX 1/4"
32G X 6 MM NEEDLE, DISPOSABLE MISCELLANEOUS
Qty: 1 | Refills: 2 | Status: COMPLETED | COMMUNITY
Start: 2018-11-06 | End: 2022-06-28

## 2022-06-28 RX ORDER — PEN NEEDLE, DIABETIC 29 G X1/2"
31G X 5 MM NEEDLE, DISPOSABLE MISCELLANEOUS
Qty: 90 | Refills: 1 | Status: COMPLETED | COMMUNITY
Start: 2020-04-29 | End: 2022-06-28

## 2022-06-28 RX ORDER — ALPRAZOLAM 0.25 MG/1
0.25 TABLET ORAL
Qty: 90 | Refills: 0 | Status: ACTIVE | COMMUNITY
Start: 2022-06-06

## 2022-06-28 NOTE — ASSESSMENT
[FreeTextEntry1] : 1) Hashimoto's - Patient with a history of Hashimoto's currently off  levothyroxine 25 ug .Since TFTs normal she will remain off. .to repeat labs. Has thyroid nodules repeat one year; largest 1.1 cm non suspicious. . \par 2) osteoporosis - Her BD 3/2019 showed significant osteoporosis T score - 4.5 L 4  , high risk for fracture. .Labs were reviewed and were normal. recommended in past  Forteo due to significant osteoporosis. Was  approved however she did not start. Also discussed Evenity which would be a good option followed by BSP or Prolia.\par recent BD is fairly stable  She has agreed to start Prolia; may start implants in October.  \par 3) Elevated kappa level - minimal elevation however her GI doctor mentioned to her ,  her spleen was enlarged; she will make appointment with hematologist/oncologist. Bring copy of report. \par 4 + JONNY to see rheumatologist \par 5) Obesity - She was doing great on low dose Saxenda so she will  resume..6 daily. \par f/u 3 mos \par labs done today  [Denosumab Therapy] : Risks  and benefits of denosumab therapy were discussed with the patient including eczema, cellulitis, osteonecrosis of the jaw and atypical femur fractures

## 2022-06-28 NOTE — PHYSICAL EXAM
[Alert] : alert [No Acute Distress] : no acute distress [Normal Voice/Communication] : normal voice communication [Normal Sclera/Conjunctiva] : normal sclera/conjunctiva [EOMI] : extra ocular movement intact [Normal Outer Ear/Nose] : the ears and nose were normal in appearance [Thyroid Not Enlarged] : the thyroid was not enlarged [No Thyroid Nodules] : no palpable thyroid nodules [No Respiratory Distress] : no respiratory distress [Clear to Auscultation] : lungs were clear to auscultation bilaterally [Normal PMI] : the apical impulse was normal [Normal Rate] : heart rate was normal [Regular Rhythm] : with a regular rhythm [No Edema] : no peripheral edema [Normal Appearance] : normal in appearance [Gynecomastia] : gynecomastia present [Normal Bowel Sounds] : normal bowel sounds [Soft] : abdomen soft [Normal Gait] : normal gait [No Joint Swelling] : no joint swelling seen [No Rash] : no rash [Normal Reflexes] : deep tendon reflexes were 2+ and symmetric [No Tremors] : no tremors [Oriented x3] : oriented to person, place, and time [Normal Insight/Judgement] : insight and judgment were intact [de-identified] : fullness around right knee and thighs seem sarmiento

## 2022-06-28 NOTE — HISTORY OF PRESENT ILLNESS
[FreeTextEntry1] : Patient with a history of Hashimoto's thyroid nodules.  Her main problem has to do with weight gain and fatigue. She has tried many diets and does not seem to lose weight and keep it off. She has been off Synthroid  a few years. . She recently resumed Saxenda but stopped while having some medical issues recently. .She is on medication for high cholesterol. She does notice some intermittent hoarseness.  She has a + JONNY saw a  rheumatologist in the past.. \par  She has  osteoporosis. She was suppose to have started Forteo but worried to start during the pandemic. she rarely goes out. .\par She has  seen heme/Onc yet for elevated kappa level and DX MGUS. Followed by heme. . .  \par She recently has been experiencing pain in her right knee and was found to have a bakers cyst. Also feel more swollen and was told has lymphedema. \par She did have the Covid vaccine. \par Having dental problems so held off starting Prolia.

## 2022-07-21 LAB
25(OH)D3 SERPL-MCNC: 32.6 NG/ML
ALBUMIN SERPL ELPH-MCNC: 4.6 G/DL
ALP BLD-CCNC: 81 U/L
ALT SERPL-CCNC: 12 U/L
ANION GAP SERPL CALC-SCNC: 13 MMOL/L
AST SERPL-CCNC: 16 U/L
BASOPHILS # BLD AUTO: 0.03 K/UL
BASOPHILS NFR BLD AUTO: 0.5 %
BILIRUB SERPL-MCNC: 0.4 MG/DL
BUN SERPL-MCNC: 18 MG/DL
CALCIUM SERPL-MCNC: 9.8 MG/DL
CHLORIDE SERPL-SCNC: 107 MMOL/L
CHOLEST SERPL-MCNC: 186 MG/DL
CO2 SERPL-SCNC: 25 MMOL/L
COLLAGEN CTX SERPL-MCNC: 542 PG/ML
CREAT SERPL-MCNC: 0.99 MG/DL
EGFR: 65 ML/MIN/1.73M2
EOSINOPHIL # BLD AUTO: 0.06 K/UL
EOSINOPHIL NFR BLD AUTO: 0.9 %
ESTIMATED AVERAGE GLUCOSE: 105 MG/DL
FOLATE SERPL-MCNC: 4.2 NG/ML
GLUCOSE SERPL-MCNC: 109 MG/DL
HBA1C MFR BLD HPLC: 5.3 %
HCT VFR BLD CALC: 43.2 %
HCYS SERPL-MCNC: 28.5 UMOL/L
HDLC SERPL-MCNC: 65 MG/DL
HGB BLD-MCNC: 14 G/DL
IF BLOCK AB SER QL: 1 AU/ML
IMM GRANULOCYTES NFR BLD AUTO: 0.6 %
INSULIN P FAST SERPL-ACNC: 15.2 UU/ML
LDLC SERPL CALC-MCNC: 100 MG/DL
LYMPHOCYTES # BLD AUTO: 1.54 K/UL
LYMPHOCYTES NFR BLD AUTO: 23.6 %
MAN DIFF?: NORMAL
MCHC RBC-ENTMCNC: 30.7 PG
MCHC RBC-ENTMCNC: 32.4 GM/DL
MCV RBC AUTO: 94.7 FL
MONOCYTES # BLD AUTO: 0.49 K/UL
MONOCYTES NFR BLD AUTO: 7.5 %
NEUTROPHILS # BLD AUTO: 4.36 K/UL
NEUTROPHILS NFR BLD AUTO: 66.9 %
NONHDLC SERPL-MCNC: 121 MG/DL
PLATELET # BLD AUTO: 207 K/UL
POTASSIUM SERPL-SCNC: 4.4 MMOL/L
PROPEPTIDE TYPE I COLLAGEN: 60 UG/L
PROT SERPL-MCNC: 7 G/DL
RBC # BLD: 4.56 M/UL
RBC # FLD: 14.3 %
SODIUM SERPL-SCNC: 145 MMOL/L
T4 FREE SERPL-MCNC: 1.2 NG/DL
T4 SERPL-MCNC: 9.1 UG/DL
THYROGLOB AB SERPL-ACNC: 24.4 IU/ML
THYROPEROXIDASE AB SERPL IA-ACNC: 307 IU/ML
TRIGL SERPL-MCNC: 106 MG/DL
TSH SERPL-ACNC: 2.48 UIU/ML
VIT B12 SERPL-MCNC: 210 PG/ML
WBC # FLD AUTO: 6.52 K/UL

## 2022-10-20 ENCOUNTER — FORM ENCOUNTER (OUTPATIENT)
Age: 62
End: 2022-10-20

## 2022-10-25 ENCOUNTER — APPOINTMENT (OUTPATIENT)
Dept: ORTHOPEDIC SURGERY | Facility: CLINIC | Age: 62
End: 2022-10-25

## 2022-11-10 ENCOUNTER — APPOINTMENT (OUTPATIENT)
Dept: ORTHOPEDIC SURGERY | Facility: CLINIC | Age: 62
End: 2022-11-10

## 2022-11-10 DIAGNOSIS — M17.11 UNILATERAL PRIMARY OSTEOARTHRITIS, RIGHT KNEE: ICD-10-CM

## 2022-11-10 DIAGNOSIS — M17.12 UNILATERAL PRIMARY OSTEOARTHRITIS, LEFT KNEE: ICD-10-CM

## 2022-11-10 PROCEDURE — 99214 OFFICE O/P EST MOD 30 MIN: CPT | Mod: 25

## 2022-11-10 PROCEDURE — 20610 DRAIN/INJ JOINT/BURSA W/O US: CPT | Mod: 50

## 2022-11-10 PROCEDURE — J3490M: CUSTOM

## 2022-11-10 NOTE — HISTORY OF PRESENT ILLNESS
[Left Leg] : left leg [Right Leg] : right leg [Gradual] : gradual [Localized] : localized [Intermittent] : intermittent [Household chores] : household chores [Rest] : rest [Walking] : walking [Stairs] : stairs [de-identified] : 11/10/22 here for a follow up on both knees had cortisone injections 05/12/22 with good relief  [] : no [FreeTextEntry1] : knees [FreeTextEntry9] : cortisone injections  [de-identified] : cortisone injections

## 2022-11-10 NOTE — PROCEDURE
[Effusion] : effusion [de-identified] : Procedure Name: Large Joint Injection / Aspiration: Depomedrol and Marcaine\par Anesthesia: ethyl chloride sprayed topically.. \par Depomedrol: An injection of Depomedrol 80 mg , 1 cc. \par Marcaine: 5 cc. \par Medication was injected in bilateral knees. Patient has tried OTC's including aspirin, Ibuprofen, Aleve etc or prescriptionNSAIDS, and/or exercises at home and/ or physical therapy without satisfactory response. After verbal consent using sterile preparation and technique. The risks, benefits, and alternatives to cortisone injection were explained in full to the patient. Risks outlined include but are not limited to infection, sepsis, bleeding, scarring, skin discoloration, temporary  increase in pain, syncopal episode, failure to resolve symptoms, allergic reaction, symptom recurrence, and elevation of blood sugar in diabetics. Patient understood the risks. All questions were answered. After discussion of options, patient requested an injection. Oral informed consent was obtained and sterile prep was done of the injection site. Sterile technique was utilized for the procedure including the preparation of the solutions used for the injection. Patient tolerated the procedure well. Advised to ice the injection site this evening. Prep with alcohol locally to site. Sterile technique used. Post Procedure Instructions: Patient was advised to call if redness, pain, or fever occur and apply ice for 15 min. out of every hour for the next 12-24 hours as tolerated.  [de-identified] : 20cc

## 2022-11-10 NOTE — IMAGING
[de-identified] : Constitutional: The patient appears well developed, well nourished. Examination of patients ability to communicate\par functionally was normal. \par Right Knee: Inspection of the knee is as follows: mild effusion. no ecchymosis. she has 1-2+ pedal edema\par tibia Palpation of the knee is as follows: medial joint line tenderness, anterior tenderness, pes anserinus\par tenderness, crepitus and patellofemoral tenderness. no tenderness. Knee Range of Motion is as\par follows: Extension: 0 degrees. Flexion: 125 degrees. Anterior pain with flexion. Strength examination of the knee\par is as follows: Quadriceps strength is 5/5 Hamstring strength is 5/5 Ligament Stability and Special Test ligamentously\par stable. Neurological examination of the knee is as follows: light touch is intact throughout. Gait and function is as\par follows: patient ambulates without assistive device and mildly antalgic gait. \par Left Knee: Inspection of the knee is as follows: no effusion, erythema, ecchymosis, scars or deformities. Palpation of\par the knee is as follows: medial joint line tenderness and pes anserinus tenderness. Knee Range of Motion is as\par follows: Flexion: 120 degrees. Range of motion 0 degrees in extension. Strength examination of the knee is as\par follows: Quadriceps strength is 5/5 Hamstring strength is 5/5 Ligament Stability and Special Test ligamentously\par stable. negative McMurrays test. Neurological examination of the knee is as follows: light touch is intact\par throughout. Gait and function is as follows: mildly antalgic gait.

## 2022-11-15 ENCOUNTER — APPOINTMENT (OUTPATIENT)
Dept: ORTHOPEDIC SURGERY | Facility: CLINIC | Age: 62
End: 2022-11-15

## 2023-01-19 ENCOUNTER — APPOINTMENT (OUTPATIENT)
Dept: ENDOCRINOLOGY | Facility: CLINIC | Age: 63
End: 2023-01-19
Payer: COMMERCIAL

## 2023-01-19 VITALS
BODY MASS INDEX: 31.25 KG/M2 | WEIGHT: 155 LBS | OXYGEN SATURATION: 95 % | SYSTOLIC BLOOD PRESSURE: 165 MMHG | HEIGHT: 59 IN | DIASTOLIC BLOOD PRESSURE: 82 MMHG | HEART RATE: 69 BPM

## 2023-01-19 PROCEDURE — 99214 OFFICE O/P EST MOD 30 MIN: CPT | Mod: 25

## 2023-01-19 PROCEDURE — 36415 COLL VENOUS BLD VENIPUNCTURE: CPT

## 2023-01-19 NOTE — ASSESSMENT
[Denosumab Therapy] : Risks  and benefits of denosumab therapy were discussed with the patient including eczema, cellulitis, osteonecrosis of the jaw and atypical femur fractures [Diabetic Medications] : Risks and benefits of diabetic medications were discussed [FreeTextEntry1] : 1) Hashimoto's - Patient with a history of Hashimoto's currently off  levothyroxine 25 ug .Since TFTs normal she will remain off. .to repeat labs. Has thyroid nodules repeat one year; largest 1.1 cm non suspicious. . \par 2) osteoporosis - Her BD 3/2019 showed significant osteoporosis T score - 4.5 L 4  , high risk for fracture. .Labs were reviewed and were normal. recommended in past  Forteo due to significant osteoporosis. Was  approved however she did not start. Also discussed Evenity which would be a good option followed by BSP or Prolia.\par recent BD is fairly stable  She has agreed to start Prolia;.  \par 3) Elevated kappa level - minimal elevation however her GI doctor mentioned to her ,  her spleen was enlarged; she will make appointment with hematologist/oncologist. Bring copy of report. \par 4 + JONNY to see rheumatologist \par 5) Obesity - She was doing great on low dose Saxenda so she will  resume..6 daily. \par f/u 2 mos \par labs done today

## 2023-01-19 NOTE — PHYSICAL EXAM
[Alert] : alert [No Acute Distress] : no acute distress [Normal Voice/Communication] : normal voice communication [Normal Sclera/Conjunctiva] : normal sclera/conjunctiva [EOMI] : extra ocular movement intact [Normal Outer Ear/Nose] : the ears and nose were normal in appearance [Thyroid Not Enlarged] : the thyroid was not enlarged [No Thyroid Nodules] : no palpable thyroid nodules [No Respiratory Distress] : no respiratory distress [Clear to Auscultation] : lungs were clear to auscultation bilaterally [Normal PMI] : the apical impulse was normal [Normal Rate] : heart rate was normal [Regular Rhythm] : with a regular rhythm [No Edema] : no peripheral edema [Normal Appearance] : normal in appearance [Gynecomastia] : gynecomastia present [Normal Bowel Sounds] : normal bowel sounds [Soft] : abdomen soft [Normal Gait] : normal gait [No Joint Swelling] : no joint swelling seen [No Rash] : no rash [Normal Reflexes] : deep tendon reflexes were 2+ and symmetric [No Tremors] : no tremors [Oriented x3] : oriented to person, place, and time [Normal Insight/Judgement] : insight and judgment were intact [de-identified] : fullness around right knee and thighs seem sarmiento

## 2023-01-19 NOTE — HISTORY OF PRESENT ILLNESS
[FreeTextEntry1] : Patient with a history of Hashimoto's thyroid nodules.  Her main problem has to do with weight gain and fatigue. She has tried many diets and does not seem to lose weight and keep it off. She has been off Synthroid  a few years. . She recently resumed Saxenda but stopped while having some medical issues recently. .She is on medication for high cholesterol. She does notice some intermittent hoarseness.  She has a + JONNY saw a  rheumatologist in the past.. \par  She has  osteoporosis. She was suppose to have started Forteo but worried to start during the pandemic. she rarely goes out. .\par She has  seen heme/Onc yet for elevated kappa level and DX MGUS. Followed by heme. . .  \par She recently has been experiencing pain in her right knee and was found to have a bakers cyst. Also feel more swollen and was told has lymphedema. \par She did have the Covid vaccine. \par Having dental problems so held off starting Prolia. Now plans to start.

## 2023-01-24 ENCOUNTER — NON-APPOINTMENT (OUTPATIENT)
Age: 63
End: 2023-01-24

## 2023-02-20 LAB
25(OH)D3 SERPL-MCNC: 40 NG/ML
ALBUMIN SERPL ELPH-MCNC: 4.6 G/DL
ALP BLD-CCNC: 74 U/L
ALT SERPL-CCNC: 11 U/L
ANION GAP SERPL CALC-SCNC: 12 MMOL/L
AST SERPL-CCNC: 15 U/L
BASOPHILS # BLD AUTO: 0.04 K/UL
BASOPHILS NFR BLD AUTO: 0.6 %
BILIRUB SERPL-MCNC: 0.5 MG/DL
BUN SERPL-MCNC: 16 MG/DL
CALCIUM SERPL-MCNC: 9.2 MG/DL
CHLORIDE SERPL-SCNC: 103 MMOL/L
CHOLEST SERPL-MCNC: 199 MG/DL
CO2 SERPL-SCNC: 26 MMOL/L
COLLAGEN CTX SERPL-MCNC: 629 PG/ML
CREAT SERPL-MCNC: 0.99 MG/DL
DEPRECATED KAPPA LC FREE/LAMBDA SER: 2.2 RATIO
EGFR: 65 ML/MIN/1.73M2
EOSINOPHIL # BLD AUTO: 0.08 K/UL
EOSINOPHIL NFR BLD AUTO: 1.2 %
ESTIMATED AVERAGE GLUCOSE: 105 MG/DL
FERRITIN SERPL-MCNC: 46 NG/ML
FOLATE SERPL-MCNC: 7.6 NG/ML
GLUCOSE SERPL-MCNC: 92 MG/DL
HBA1C MFR BLD HPLC: 5.3 %
HCT VFR BLD CALC: 42.1 %
HCYS SERPL-MCNC: 20.3 UMOL/L
HDLC SERPL-MCNC: 64 MG/DL
HGB BLD-MCNC: 14.1 G/DL
IMM GRANULOCYTES NFR BLD AUTO: 0.4 %
IRON SATN MFR SERPL: 22 %
IRON SERPL-MCNC: 66 UG/DL
KAPPA LC CSF-MCNC: 1.28 MG/DL
KAPPA LC SERPL-MCNC: 2.81 MG/DL
LDLC SERPL CALC-MCNC: 104 MG/DL
LYMPHOCYTES # BLD AUTO: 1.59 K/UL
LYMPHOCYTES NFR BLD AUTO: 23.7 %
M PROTEIN SPEC IFE-MCNC: NORMAL
MAN DIFF?: NORMAL
MCHC RBC-ENTMCNC: 30 PG
MCHC RBC-ENTMCNC: 33.5 GM/DL
MCV RBC AUTO: 89.6 FL
MONOCYTES # BLD AUTO: 0.48 K/UL
MONOCYTES NFR BLD AUTO: 7.2 %
NEUTROPHILS # BLD AUTO: 4.49 K/UL
NEUTROPHILS NFR BLD AUTO: 66.9 %
NONHDLC SERPL-MCNC: 135 MG/DL
PLATELET # BLD AUTO: 204 K/UL
POTASSIUM SERPL-SCNC: 4.3 MMOL/L
PROT SERPL-MCNC: 7.4 G/DL
RBC # BLD: 4.7 M/UL
RBC # FLD: 13.4 %
SODIUM SERPL-SCNC: 141 MMOL/L
T4 FREE SERPL-MCNC: 1.2 NG/DL
T4 SERPL-MCNC: 9.6 UG/DL
TIBC SERPL-MCNC: 300 UG/DL
TRIGL SERPL-MCNC: 154 MG/DL
TSH SERPL-ACNC: 3.34 UIU/ML
UIBC SERPL-MCNC: 235 UG/DL
VIT B12 SERPL-MCNC: 243 PG/ML
WBC # FLD AUTO: 6.71 K/UL

## 2023-03-24 ENCOUNTER — APPOINTMENT (OUTPATIENT)
Dept: ENDOCRINOLOGY | Facility: CLINIC | Age: 63
End: 2023-03-24
Payer: COMMERCIAL

## 2023-03-24 VITALS
OXYGEN SATURATION: 97 % | DIASTOLIC BLOOD PRESSURE: 81 MMHG | SYSTOLIC BLOOD PRESSURE: 147 MMHG | HEART RATE: 74 BPM | BODY MASS INDEX: 31.45 KG/M2 | WEIGHT: 156 LBS | HEIGHT: 59 IN

## 2023-03-24 PROCEDURE — 96401 CHEMO ANTI-NEOPL SQ/IM: CPT

## 2023-03-24 RX ORDER — DENOSUMAB 60 MG/ML
60 INJECTION SUBCUTANEOUS
Qty: 60 | Refills: 0 | Status: COMPLETED | OUTPATIENT
Start: 2023-03-23

## 2023-04-11 ENCOUNTER — APPOINTMENT (OUTPATIENT)
Dept: ENDOCRINOLOGY | Facility: CLINIC | Age: 63
End: 2023-04-11
Payer: COMMERCIAL

## 2023-04-11 VITALS
DIASTOLIC BLOOD PRESSURE: 76 MMHG | HEIGHT: 58.75 IN | OXYGEN SATURATION: 96 % | HEART RATE: 81 BPM | SYSTOLIC BLOOD PRESSURE: 142 MMHG

## 2023-04-11 PROCEDURE — 99214 OFFICE O/P EST MOD 30 MIN: CPT

## 2023-04-11 NOTE — HISTORY OF PRESENT ILLNESS
[FreeTextEntry1] : Patient with a history of Hashimoto's thyroid nodules.  Her main problem has to do with weight gain and fatigue. She has tried many diets and does not seem to lose weight and keep it off. She has been off Synthroid  a few years. . She recently resumed Saxenda but stopped while having some medical issues recently. .She is on medication for high cholesterol. She does notice some intermittent hoarseness.  She has a + JONNY saw a  rheumatologist in the past.. \par  She has  osteoporosis. She was suppose to have started Forteo but worried to start during the pandemic. she rarely goes out. .\par She has  seen heme/Onc yet for elevated kappa level and DX MGUS. Followed by heme. . .  \par She recently has been experiencing pain in her right knee and was found to have a bakers cyst. Also feel more swollen and was told has lymphedema. May need bilateral knee replacements. \par She did have the Covid vaccine. Had +  cardiac calcium score was told to take aspirin and stay on statin. \par Having dental problems so held off starting Prolia but did get her first injection a few weeks ago without any side effects. .

## 2023-04-11 NOTE — ASSESSMENT
[Denosumab Therapy] : Risks  and benefits of denosumab therapy were discussed with the patient including eczema, cellulitis, osteonecrosis of the jaw and atypical femur fractures [FreeTextEntry1] : 1) Hashimoto's - Patient with a history of Hashimoto's currently off  levothyroxine 25 ug .Since TFTs normal she will remain off. .to repeat labs. Has thyroid nodules repeat one year; largest 1.1 cm non suspicious. . To repeat sonogram. \par 2) osteoporosis - Her BD 3/2019 showed significant osteoporosis T score - 4.5 L 4  , high risk for fracture. .Labs were reviewed and were normal. recommended in past  Forteo due to significant osteoporosis. Was  approved however she did not start. Also discussed Evenity which would be a good option followed by BSP or Prolia.\par recent BD is fairly stable  She has agreed to start Prolia received first injection last mos which she tolerated well. Encourage weight bearing exercise vitamin D and calcium supplement\par 3) Elevated kappa level - minimal elevation however her GI doctor mentioned to her ,  her spleen was enlarged; she will make appointment with hematologist/oncologist. Bring copy of report. \par 4 + JONNY to see rheumatologist \par 5) Obesity - She was doing great on low dose Saxenda so she will  consider  resuming. \par 6) Hyperlipidemia with moderate calcium score on statin and asp.  \par f/u 3 mos \par labs before next visit

## 2023-04-11 NOTE — PHYSICAL EXAM
[Alert] : alert [No Acute Distress] : no acute distress [Normal Voice/Communication] : normal voice communication [Normal Sclera/Conjunctiva] : normal sclera/conjunctiva [EOMI] : extra ocular movement intact [Normal Outer Ear/Nose] : the ears and nose were normal in appearance [Thyroid Not Enlarged] : the thyroid was not enlarged [No Thyroid Nodules] : no palpable thyroid nodules [No Respiratory Distress] : no respiratory distress [Clear to Auscultation] : lungs were clear to auscultation bilaterally [Normal PMI] : the apical impulse was normal [Normal Rate] : heart rate was normal [Regular Rhythm] : with a regular rhythm [No Edema] : no peripheral edema [Normal Appearance] : normal in appearance [Gynecomastia] : gynecomastia present [Normal Bowel Sounds] : normal bowel sounds [Soft] : abdomen soft [Normal Gait] : normal gait [No Joint Swelling] : no joint swelling seen [No Rash] : no rash [Normal Reflexes] : deep tendon reflexes were 2+ and symmetric [No Tremors] : no tremors [Oriented x3] : oriented to person, place, and time [Normal Insight/Judgement] : insight and judgment were intact [de-identified] : fullness around right knee and thighs seem sarmiento

## 2023-05-18 ENCOUNTER — APPOINTMENT (OUTPATIENT)
Dept: ORTHOPEDIC SURGERY | Facility: CLINIC | Age: 63
End: 2023-05-18

## 2023-05-18 VITALS — BODY MASS INDEX: 32.75 KG/M2 | WEIGHT: 156 LBS | HEIGHT: 58 IN

## 2023-06-15 ENCOUNTER — LABORATORY RESULT (OUTPATIENT)
Age: 63
End: 2023-06-15

## 2023-06-15 ENCOUNTER — APPOINTMENT (OUTPATIENT)
Dept: ENDOCRINOLOGY | Facility: CLINIC | Age: 63
End: 2023-06-15
Payer: COMMERCIAL

## 2023-06-15 VITALS
DIASTOLIC BLOOD PRESSURE: 96 MMHG | SYSTOLIC BLOOD PRESSURE: 162 MMHG | WEIGHT: 161.5 LBS | HEART RATE: 91 BPM | BODY MASS INDEX: 32.56 KG/M2 | HEIGHT: 59 IN | OXYGEN SATURATION: 96 %

## 2023-06-15 LAB
GLUCOSE BLDC GLUCOMTR-MCNC: 108
HBA1C MFR BLD HPLC: 5.2

## 2023-06-15 PROCEDURE — 99214 OFFICE O/P EST MOD 30 MIN: CPT | Mod: 25

## 2023-06-15 PROCEDURE — 36415 COLL VENOUS BLD VENIPUNCTURE: CPT

## 2023-06-15 PROCEDURE — 83036 HEMOGLOBIN GLYCOSYLATED A1C: CPT | Mod: QW

## 2023-06-15 PROCEDURE — 82962 GLUCOSE BLOOD TEST: CPT

## 2023-06-15 NOTE — PHYSICAL EXAM
[Alert] : alert [No Acute Distress] : no acute distress [Normal Voice/Communication] : normal voice communication [Normal Sclera/Conjunctiva] : normal sclera/conjunctiva [EOMI] : extra ocular movement intact [Normal Outer Ear/Nose] : the ears and nose were normal in appearance [Thyroid Not Enlarged] : the thyroid was not enlarged [No Thyroid Nodules] : no palpable thyroid nodules [No Respiratory Distress] : no respiratory distress [Clear to Auscultation] : lungs were clear to auscultation bilaterally [Normal PMI] : the apical impulse was normal [Normal Rate] : heart rate was normal [Regular Rhythm] : with a regular rhythm [No Edema] : no peripheral edema [Normal Appearance] : normal in appearance [Gynecomastia] : gynecomastia present [Normal Bowel Sounds] : normal bowel sounds [Soft] : abdomen soft [Normal Gait] : normal gait [No Joint Swelling] : no joint swelling seen [No Rash] : no rash [Normal Reflexes] : deep tendon reflexes were 2+ and symmetric [No Tremors] : no tremors [Oriented x3] : oriented to person, place, and time [Normal Insight/Judgement] : insight and judgment were intact [de-identified] : fullness around right knee and thighs seem sarmiento

## 2023-06-15 NOTE — ASSESSMENT
[Carbohydrate Consistent Diet] : carbohydrate consistent diet [Importance of Diet and Exercise] : importance of diet and exercise to improve glycemic control, achieve weight loss and improve cardiovascular health [Denosumab Therapy] : Risks  and benefits of denosumab therapy were discussed with the patient including eczema, cellulitis, osteonecrosis of the jaw and atypical femur fractures [Diabetic Medications] : Risks and benefits of diabetic medications were discussed [FreeTextEntry1] : 1) Hashimoto's - Patient with a history of Hashimoto's currently off  levothyroxine 25 ug .Since TFTs normal she will remain off. .to repeat labs. Has thyroid nodules repeat one year; largest 1.1 cm non suspicious. . To repeat sonogram. \par 2) osteoporosis - Her BD 3/2019 showed significant osteoporosis T score - 4.5 L 4  , high risk for fracture. .Labs were reviewed and were normal. recommended in past  Forteo due to significant osteoporosis. Was  approved however she did not start. Also discussed Evenity which would be a good option followed by BSP or Prolia.\par recent BD is fairly stable  She has agreed to start Prolia received first injection last mos which she tolerated well. Encourage weight bearing exercise vitamin D and calcium supplement\par 3) Elevated kappa level - minimal elevation however her GI doctor mentioned to her ,  her spleen was enlarged; she will make appointment with hematologist/oncologist. Bring copy of report. \par 4 + JONNY to see rheumatologist \par 5) Obesity - She was doing great on low dose Saxenda so she will  consider  resuming. \par 6) Hyperlipidemia with moderate calcium score on statin and asp.  \par f/u 3 mos \par labs done

## 2023-06-16 LAB
25(OH)D3 SERPL-MCNC: 37.3 NG/ML
ALBUMIN SERPL ELPH-MCNC: 4.8 G/DL
ALP BLD-CCNC: 68 U/L
ALT SERPL-CCNC: 11 U/L
ANION GAP SERPL CALC-SCNC: 13 MMOL/L
AST SERPL-CCNC: 18 U/L
BILIRUB SERPL-MCNC: 0.4 MG/DL
BUN SERPL-MCNC: 17 MG/DL
CALCIUM SERPL-MCNC: 10 MG/DL
CHLORIDE SERPL-SCNC: 103 MMOL/L
CHOLEST SERPL-MCNC: 183 MG/DL
CO2 SERPL-SCNC: 25 MMOL/L
CREAT SERPL-MCNC: 1.05 MG/DL
CRP SERPL HS-MCNC: 1.48 MG/L
EGFR: 60 ML/MIN/1.73M2
FOLATE SERPL-MCNC: 4.6 NG/ML
GLUCOSE SERPL-MCNC: 97 MG/DL
HCYS SERPL-MCNC: 25.7 UMOL/L
HDLC SERPL-MCNC: 62 MG/DL
LDLC SERPL CALC-MCNC: 91 MG/DL
NONHDLC SERPL-MCNC: 121 MG/DL
POTASSIUM SERPL-SCNC: 4.7 MMOL/L
PROT SERPL-MCNC: 7.5 G/DL
SODIUM SERPL-SCNC: 141 MMOL/L
T4 FREE SERPL-MCNC: 1.2 NG/DL
T4 SERPL-MCNC: 10 UG/DL
TRIGL SERPL-MCNC: 149 MG/DL
VIT B12 SERPL-MCNC: 223 PG/ML

## 2023-06-20 LAB
ALBUMIN MFR SERPL ELPH: 59.3 %
ALBUMIN SERPL-MCNC: 4.4 G/DL
ALBUMIN/GLOB SERPL: 1.4 RATIO
ALPHA1 GLOB MFR SERPL ELPH: 4.1 %
ALPHA1 GLOB SERPL ELPH-MCNC: 0.3 G/DL
ALPHA2 GLOB MFR SERPL ELPH: 8.8 %
ALPHA2 GLOB SERPL ELPH-MCNC: 0.7 G/DL
B-GLOBULIN MFR SERPL ELPH: 11.3 %
B-GLOBULIN SERPL ELPH-MCNC: 0.8 G/DL
DEPRECATED KAPPA LC FREE/LAMBDA SER: 2.16 RATIO
GAMMA GLOB FLD ELPH-MCNC: 1.2 G/DL
GAMMA GLOB MFR SERPL ELPH: 16.5 %
IGA SER QL IEP: 174 MG/DL
IGG SER QL IEP: 1163 MG/DL
IGM SER QL IEP: 96 MG/DL
INTERPRETATION SERPL IEP-IMP: NORMAL
KAPPA LC CSF-MCNC: 1.32 MG/DL
KAPPA LC SERPL-MCNC: 2.85 MG/DL
M PROTEIN SPEC IFE-MCNC: NORMAL
PROT SERPL-MCNC: 7.5 G/DL
PROT SERPL-MCNC: 7.5 G/DL
THYROGLOB AB SERPL-ACNC: 33 IU/ML
THYROPEROXIDASE AB SERPL IA-ACNC: 418 IU/ML

## 2023-07-10 ENCOUNTER — NON-APPOINTMENT (OUTPATIENT)
Age: 63
End: 2023-07-10

## 2023-07-10 LAB — COLLAGEN CTX SERPL-MCNC: 100 PG/ML

## 2023-07-14 ENCOUNTER — NON-APPOINTMENT (OUTPATIENT)
Age: 63
End: 2023-07-14

## 2023-08-07 ENCOUNTER — RX RENEWAL (OUTPATIENT)
Age: 63
End: 2023-08-07

## 2023-08-07 RX ORDER — DENOSUMAB 60 MG/ML
60 INJECTION SUBCUTANEOUS
Qty: 1 | Refills: 3 | Status: ACTIVE | COMMUNITY
Start: 2021-10-18 | End: 1900-01-01

## 2023-08-22 ENCOUNTER — APPOINTMENT (OUTPATIENT)
Dept: OBGYN | Facility: CLINIC | Age: 63
End: 2023-08-22
Payer: COMMERCIAL

## 2023-08-22 PROCEDURE — 76830 TRANSVAGINAL US NON-OB: CPT

## 2023-08-22 PROCEDURE — 99396 PREV VISIT EST AGE 40-64: CPT

## 2023-08-22 PROCEDURE — 76856 US EXAM PELVIC COMPLETE: CPT | Mod: 59

## 2023-08-22 PROCEDURE — 81002 URINALYSIS NONAUTO W/O SCOPE: CPT

## 2023-09-21 ENCOUNTER — APPOINTMENT (OUTPATIENT)
Dept: ORTHOPEDIC SURGERY | Facility: CLINIC | Age: 63
End: 2023-09-21

## 2023-09-26 ENCOUNTER — APPOINTMENT (OUTPATIENT)
Dept: ENDOCRINOLOGY | Facility: CLINIC | Age: 63
End: 2023-09-26
Payer: COMMERCIAL

## 2023-09-26 VITALS
HEIGHT: 59 IN | HEART RATE: 80 BPM | WEIGHT: 161 LBS | BODY MASS INDEX: 32.46 KG/M2 | SYSTOLIC BLOOD PRESSURE: 159 MMHG | DIASTOLIC BLOOD PRESSURE: 83 MMHG | OXYGEN SATURATION: 96 %

## 2023-09-26 PROCEDURE — 36415 COLL VENOUS BLD VENIPUNCTURE: CPT

## 2023-09-26 PROCEDURE — 99214 OFFICE O/P EST MOD 30 MIN: CPT | Mod: 25

## 2023-09-26 PROCEDURE — 96401 CHEMO ANTI-NEOPL SQ/IM: CPT

## 2023-09-26 RX ORDER — DENOSUMAB 60 MG/ML
60 INJECTION SUBCUTANEOUS
Qty: 0 | Refills: 0 | Status: COMPLETED | OUTPATIENT
Start: 2023-09-26

## 2023-09-26 RX ADMIN — DENOSUMAB 60 MG/ML: 60 INJECTION SUBCUTANEOUS at 00:00

## 2023-09-28 LAB
25(OH)D3 SERPL-MCNC: 37.5 NG/ML
ALBUMIN SERPL ELPH-MCNC: 4.7 G/DL
ALP BLD-CCNC: 71 U/L
ALT SERPL-CCNC: 12 U/L
ANION GAP SERPL CALC-SCNC: 12 MMOL/L
AST SERPL-CCNC: 18 U/L
BILIRUB SERPL-MCNC: 0.5 MG/DL
BUN SERPL-MCNC: 15 MG/DL
CALCIUM SERPL-MCNC: 10.1 MG/DL
CHLORIDE SERPL-SCNC: 103 MMOL/L
CHOLEST SERPL-MCNC: 185 MG/DL
CO2 SERPL-SCNC: 26 MMOL/L
CREAT SERPL-MCNC: 1.06 MG/DL
EGFR: 59 ML/MIN/1.73M2
FERRITIN SERPL-MCNC: 49 NG/ML
FOLATE SERPL-MCNC: 9.4 NG/ML
GLUCOSE SERPL-MCNC: 98 MG/DL
HCYS SERPL-MCNC: 19.1 UMOL/L
HDLC SERPL-MCNC: 65 MG/DL
IRON SATN MFR SERPL: 19 %
IRON SERPL-MCNC: 63 UG/DL
LDLC SERPL CALC-MCNC: 93 MG/DL
NONHDLC SERPL-MCNC: 120 MG/DL
POTASSIUM SERPL-SCNC: 5.2 MMOL/L
PROT SERPL-MCNC: 7.6 G/DL
SODIUM SERPL-SCNC: 141 MMOL/L
T4 FREE SERPL-MCNC: 1.1 NG/DL
T4 SERPL-MCNC: 9.3 UG/DL
TIBC SERPL-MCNC: 338 UG/DL
TRIGL SERPL-MCNC: 158 MG/DL
TSH SERPL-ACNC: 3.96 UIU/ML
UIBC SERPL-MCNC: 275 UG/DL
VIT B12 SERPL-MCNC: 309 PG/ML

## 2023-10-02 LAB
ALP BONE SERPL-MCNC: 9.3 UG/L
COLLAGEN CTX SERPL-MCNC: 358 PG/ML

## 2023-11-09 ENCOUNTER — APPOINTMENT (OUTPATIENT)
Dept: ORTHOPEDIC SURGERY | Facility: CLINIC | Age: 63
End: 2023-11-09

## 2023-12-26 RX ORDER — PEN NEEDLE, DIABETIC 29 G X1/2"
32G X 4 MM NEEDLE, DISPOSABLE MISCELLANEOUS
Qty: 2 | Refills: 2 | Status: ACTIVE | COMMUNITY
Start: 2023-12-26 | End: 1900-01-01

## 2024-01-16 ENCOUNTER — RX CHANGE (OUTPATIENT)
Age: 64
End: 2024-01-16

## 2024-01-22 NOTE — ASSESSMENT
[FreeTextEntry1] : BILAT KNEE OA. LT KNEE WITH SCE AND EFFUSION. \par SHE STATES IN THE PAST CSI HAVE NOT HELPED HER ISSUES SHE WOULD LIKE TO AVOID DISCUSSED THE ROLE OF ICE NSAIDS AND THERAPY. REVIEWED THE XRAYS ARE STABLE. REVIEWD MRI RIGHT KNEE WITH MEDIAL SCE. BILAT KNEE STILL WITH PAIN AND SWELLING. TREATMENT OPTIONS AGAIN REVIEWED. WILL REPEAT BILAT KNEE CSI TODAY.\par  your covid test was negative

## 2024-03-28 ENCOUNTER — APPOINTMENT (OUTPATIENT)
Dept: ENDOCRINOLOGY | Facility: CLINIC | Age: 64
End: 2024-03-28
Payer: COMMERCIAL

## 2024-03-28 VITALS
WEIGHT: 166 LBS | HEART RATE: 98 BPM | HEIGHT: 59 IN | BODY MASS INDEX: 33.47 KG/M2 | OXYGEN SATURATION: 99 % | DIASTOLIC BLOOD PRESSURE: 80 MMHG | SYSTOLIC BLOOD PRESSURE: 133 MMHG

## 2024-03-28 DIAGNOSIS — E53.8 DEFICIENCY OF OTHER SPECIFIED B GROUP VITAMINS: ICD-10-CM

## 2024-03-28 DIAGNOSIS — E06.3 AUTOIMMUNE THYROIDITIS: ICD-10-CM

## 2024-03-28 DIAGNOSIS — M81.0 AGE-RELATED OSTEOPOROSIS W/OUT CURRENT PATHOLOGICAL FRACTURE: ICD-10-CM

## 2024-03-28 DIAGNOSIS — E04.1 NONTOXIC SINGLE THYROID NODULE: ICD-10-CM

## 2024-03-28 DIAGNOSIS — E55.9 VITAMIN D DEFICIENCY, UNSPECIFIED: ICD-10-CM

## 2024-03-28 DIAGNOSIS — R79.89 OTHER SPECIFIED ABNORMAL FINDINGS OF BLOOD CHEMISTRY: ICD-10-CM

## 2024-03-28 DIAGNOSIS — I10 ESSENTIAL (PRIMARY) HYPERTENSION: ICD-10-CM

## 2024-03-28 DIAGNOSIS — E78.49 OTHER HYPERLIPIDEMIA: ICD-10-CM

## 2024-03-28 DIAGNOSIS — D89.89 OTHER SPECIFIED DISORDERS INVOLVING THE IMMUNE MECHANISM, NOT ELSEWHERE CLASSIFIED: ICD-10-CM

## 2024-03-28 PROCEDURE — 99214 OFFICE O/P EST MOD 30 MIN: CPT | Mod: 25

## 2024-03-28 PROCEDURE — 36415 COLL VENOUS BLD VENIPUNCTURE: CPT

## 2024-03-28 NOTE — PHYSICAL EXAM
[Alert] : alert [No Acute Distress] : no acute distress [Normal Voice/Communication] : normal voice communication [Normal Sclera/Conjunctiva] : normal sclera/conjunctiva [EOMI] : extra ocular movement intact [Normal Outer Ear/Nose] : the ears and nose were normal in appearance [Thyroid Not Enlarged] : the thyroid was not enlarged [No Respiratory Distress] : no respiratory distress [No Thyroid Nodules] : no palpable thyroid nodules [Clear to Auscultation] : lungs were clear to auscultation bilaterally [Normal PMI] : the apical impulse was normal [Normal Rate] : heart rate was normal [Regular Rhythm] : with a regular rhythm [No Edema] : no peripheral edema [Normal Appearance] : normal in appearance [Gynecomastia] : gynecomastia present [Soft] : abdomen soft [Normal Bowel Sounds] : normal bowel sounds [Normal Gait] : normal gait [No Joint Swelling] : no joint swelling seen [No Rash] : no rash [No Tremors] : no tremors [Normal Reflexes] : deep tendon reflexes were 2+ and symmetric [Oriented x3] : oriented to person, place, and time [Normal Insight/Judgement] : insight and judgment were intact [de-identified] : fullness around right knee and thighs seem sarmiento

## 2024-03-28 NOTE — REASON FOR VISIT
[Follow - Up] : a follow-up visit [Hypothyroidism] : hypothyroidism [Osteoporosis] : osteoporosis [Thyroid nodule/ MNG] : thyroid nodule/ MNG [Weight Management/Obesity] : weight management/obesity

## 2024-03-28 NOTE — ASSESSMENT
[Carbohydrate Consistent Diet] : carbohydrate consistent diet [Importance of Diet and Exercise] : importance of diet and exercise to improve glycemic control, achieve weight loss and improve cardiovascular health [Denosumab Therapy] : Risks  and benefits of denosumab therapy were discussed with the patient including eczema, cellulitis, osteonecrosis of the jaw and atypical femur fractures [Diabetic Medications] : Risks and benefits of diabetic medications were discussed [FreeTextEntry1] : 1) Hashimoto's - Patient with a history of Hashimoto's currently off levothyroxine 25 ug. Since TFTs normal she will remain off. .to repeat labs. Has thyroid nodules repeat one year; largest 1.1 cm non suspicious. . To repeat sonogram.  2) osteoporosis - Her BD 3/2019 showed significant osteoporosis T score - 4.5 L 4  , high risk for fracture. .Labs were reviewed and were normal. recommended in past  Forteo due to significant osteoporosis. Was  approved however she did not start. Also discussed Evenity which would be a good option followed by BSP or Prolia. recent BD is fairly stable She had agreed to start Prolia due to receive her fourth injection however the office has not received it yet.  Encourage weight bearing exercise vitamin D and calcium supplement.  She is noting some increase in arthritic symptoms. 3) Elevated kappa level - minimal elevation however her GI doctor mentioned to her ,  her spleen was enlarged; she will make appointment with hematologist/oncologist. Bring copy of report.  4 + JONNY to see rheumatologist.  5) Obesity - She was doing great on low dose Saxenda so she will consider  resuming.  She did take a few injections of the lowest dose of Saxenda will now try a weekly so Wegovy was ordered. 6) Hyperlipidemia with moderate calcium score on statin and asp.   f/u 3 mos  labs done

## 2024-03-28 NOTE — DISCUSSION/SUMMARY
[FreeTextEntry1] : MAMADOU LMM to call re labs low B12/FA check to see if taking also homocysteine elevated. . TSH borderline elevated to monitor.  CTX appropriately low since started Prolia.

## 2024-03-28 NOTE — HISTORY OF PRESENT ILLNESS
[FreeTextEntry1] : Patient with a history of Hashimoto's thyroid nodules.  Her main problem has to do with weight gain and fatigue. She has tried many diets and does not seem to lose weight and keep it off. She has been off Synthroid  a few years. . She recently resumed Saxenda but stopped while having some medical issues recently. .She is on medication for high cholesterol. She does notice some intermittent hoarseness.  She has a + JONNY saw a  rheumatologist in the past..   She has  osteoporosis. She was suppose to have started Forteo but worried to start during the pandemic. she rarely goes out. . She has  seen heme/Onc yet for elevated kappa level and DX MGUS. Followed by heme. . .   She recently has been experiencing pain in her right knee and was found to have a bakers cyst. Also feel more swollen and was told has lymphedema. May need bilateral knee replacements.  She did have the Covid vaccine. Had +  cardiac calcium score was told to take aspirin and stay on statin.  Having dental problems so held off starting Prolia but did get her first injection 6 mos.  ago without any side effects. .

## 2024-04-01 DIAGNOSIS — R53.83 OTHER FATIGUE: ICD-10-CM

## 2024-04-01 LAB
25(OH)D3 SERPL-MCNC: 38.7 NG/ML
ALBUMIN SERPL ELPH-MCNC: 4.8 G/DL
ALP BLD-CCNC: 65 U/L
ALT SERPL-CCNC: 8 U/L
ANA PAT FLD IF-IMP: ABNORMAL
ANA SER IF-ACNC: ABNORMAL
ANION GAP SERPL CALC-SCNC: 13 MMOL/L
AST SERPL-CCNC: 13 U/L
BILIRUB SERPL-MCNC: 0.5 MG/DL
BUN SERPL-MCNC: 14 MG/DL
CALCIUM SERPL-MCNC: 9.5 MG/DL
CHLORIDE SERPL-SCNC: 103 MMOL/L
CHOLEST SERPL-MCNC: 154 MG/DL
CO2 SERPL-SCNC: 24 MMOL/L
CREAT SERPL-MCNC: 0.99 MG/DL
EGFR: 64 ML/MIN/1.73M2
ERYTHROCYTE [SEDIMENTATION RATE] IN BLOOD BY WESTERGREN METHOD: 23 MM/HR
ESTIMATED AVERAGE GLUCOSE: 105 MG/DL
FOLATE SERPL-MCNC: 6.3 NG/ML
GLUCOSE SERPL-MCNC: 98 MG/DL
HBA1C MFR BLD HPLC: 5.3 %
HDLC SERPL-MCNC: 57 MG/DL
LDLC SERPL CALC-MCNC: 77 MG/DL
NONHDLC SERPL-MCNC: 96 MG/DL
POTASSIUM SERPL-SCNC: 4.1 MMOL/L
PROT SERPL-MCNC: 7.4 G/DL
SODIUM SERPL-SCNC: 140 MMOL/L
T4 FREE SERPL-MCNC: 1.2 NG/DL
T4 SERPL-MCNC: 10.1 UG/DL
TRIGL SERPL-MCNC: 106 MG/DL
TSH SERPL-ACNC: 3.4 UIU/ML
VIT B12 SERPL-MCNC: 253 PG/ML

## 2024-04-17 ENCOUNTER — APPOINTMENT (OUTPATIENT)
Dept: ENDOCRINOLOGY | Facility: CLINIC | Age: 64
End: 2024-04-17
Payer: COMMERCIAL

## 2024-04-17 PROCEDURE — 96401 CHEMO ANTI-NEOPL SQ/IM: CPT

## 2024-04-17 RX ADMIN — DENOSUMAB 0 MG/ML: 60 INJECTION SUBCUTANEOUS at 00:00

## 2024-04-18 LAB
ALBUMIN MFR SERPL ELPH: 58.3 %
ALBUMIN SERPL-MCNC: 4.3 G/DL
ALBUMIN/GLOB SERPL: 1.4 RATIO
ALPHA1 GLOB MFR SERPL ELPH: 4.9 %
ALPHA1 GLOB SERPL ELPH-MCNC: 0.4 G/DL
ALPHA2 GLOB MFR SERPL ELPH: 9.8 %
ALPHA2 GLOB SERPL ELPH-MCNC: 0.7 G/DL
B-GLOBULIN MFR SERPL ELPH: 11.5 %
B-GLOBULIN SERPL ELPH-MCNC: 0.9 G/DL
COLLAGEN CTX SERPL-MCNC: 259 PG/ML
DEPRECATED KAPPA LC FREE/LAMBDA SER: 2.13 RATIO
GAMMA GLOB FLD ELPH-MCNC: 1.1 G/DL
GAMMA GLOB MFR SERPL ELPH: 15.5 %
HCYS SERPL-MCNC: 25.6 UMOL/L
IGA SER QL IEP: 176 MG/DL
IGG SER QL IEP: 1129 MG/DL
IGM SER QL IEP: 100 MG/DL
INTERPRETATION SERPL IEP-IMP: NORMAL
KAPPA LC CSF-MCNC: 1.4 MG/DL
KAPPA LC SERPL-MCNC: 2.98 MG/DL
M PROTEIN MFR SERPL ELPH: NORMAL
M PROTEIN SPEC IFE-MCNC: NORMAL
MONOCLON BAND OBS SERPL: NORMAL
PROT SERPL-MCNC: 7.4 G/DL
PROT SERPL-MCNC: 7.4 G/DL

## 2024-04-18 RX ORDER — DENOSUMAB 60 MG/ML
60 INJECTION SUBCUTANEOUS
Qty: 0 | Refills: 0 | Status: COMPLETED | OUTPATIENT
Start: 2024-04-17

## 2024-05-06 ENCOUNTER — APPOINTMENT (OUTPATIENT)
Dept: OTOLARYNGOLOGY | Facility: CLINIC | Age: 64
End: 2024-05-06
Payer: COMMERCIAL

## 2024-05-06 DIAGNOSIS — H91.21 SUDDEN IDIOPATHIC HEARING LOSS, RIGHT EAR: ICD-10-CM

## 2024-05-06 DIAGNOSIS — B96.89 ACUTE SINUSITIS, UNSPECIFIED: ICD-10-CM

## 2024-05-06 DIAGNOSIS — J01.90 ACUTE SINUSITIS, UNSPECIFIED: ICD-10-CM

## 2024-05-06 PROCEDURE — 99204 OFFICE O/P NEW MOD 45 MIN: CPT

## 2024-05-06 PROCEDURE — 92567 TYMPANOMETRY: CPT

## 2024-05-06 PROCEDURE — 92557 COMPREHENSIVE HEARING TEST: CPT

## 2024-05-06 RX ORDER — FOLIC ACID 1 MG/1
1 TABLET ORAL
Qty: 90 | Refills: 1 | Status: DISCONTINUED | COMMUNITY
Start: 2022-08-05 | End: 2024-05-06

## 2024-05-06 RX ORDER — SEMAGLUTIDE 0.25 MG/.5ML
0.25 INJECTION, SOLUTION SUBCUTANEOUS
Qty: 1 | Refills: 1 | Status: DISCONTINUED | COMMUNITY
Start: 2024-03-28 | End: 2024-05-06

## 2024-05-06 RX ORDER — TRAMADOL HYDROCHLORIDE 50 MG/1
50 TABLET, COATED ORAL
Qty: 40 | Refills: 0 | Status: DISCONTINUED | COMMUNITY
Start: 2021-09-21 | End: 2024-05-06

## 2024-05-06 RX ORDER — FLUTICASONE PROPIONATE AND SALMETEROL 50; 250 UG/1; UG/1
250-50 POWDER RESPIRATORY (INHALATION)
Qty: 60 | Refills: 0 | Status: DISCONTINUED | COMMUNITY
Start: 2021-03-22 | End: 2024-05-06

## 2024-05-06 RX ORDER — AZITHROMYCIN 250 MG/1
250 TABLET, FILM COATED ORAL
Qty: 6 | Refills: 0 | Status: ACTIVE | COMMUNITY
Start: 2024-05-06 | End: 1900-01-01

## 2024-05-06 RX ORDER — FLUTICASONE PROPIONATE AND SALMETEROL 500; 50 UG/1; UG/1
500-50 POWDER RESPIRATORY (INHALATION)
Refills: 0 | Status: ACTIVE | COMMUNITY

## 2024-05-06 RX ORDER — METHYLPREDNISOLONE 4 MG/1
4 TABLET ORAL
Qty: 1 | Refills: 0 | Status: ACTIVE | COMMUNITY
Start: 2024-05-06 | End: 1900-01-01

## 2024-05-06 RX ORDER — DICLOFENAC SODIUM 20 MG/G
2 SOLUTION TOPICAL
Qty: 112 | Refills: 0 | Status: DISCONTINUED | COMMUNITY
Start: 2021-08-16 | End: 2024-05-06

## 2024-05-06 RX ORDER — ROSUVASTATIN CALCIUM 10 MG/1
10 TABLET, FILM COATED ORAL
Qty: 90 | Refills: 0 | Status: DISCONTINUED | COMMUNITY
Start: 2021-08-13 | End: 2024-05-06

## 2024-05-06 RX ORDER — POTASSIUM CHLORIDE 1500 MG/1
20 TABLET, EXTENDED RELEASE ORAL
Qty: 7 | Refills: 0 | Status: DISCONTINUED | COMMUNITY
Start: 2021-05-20 | End: 2024-05-06

## 2024-05-06 RX ORDER — FLUTICASONE PROPIONATE 50 UG/1
50 SPRAY, METERED NASAL
Qty: 3 | Refills: 0 | Status: ACTIVE | COMMUNITY
Start: 2024-05-06 | End: 1900-01-01

## 2024-05-06 RX ORDER — AMOXICILLIN 500 MG/1
500 CAPSULE ORAL
Qty: 40 | Refills: 0 | Status: DISCONTINUED | COMMUNITY
Start: 2021-04-19 | End: 2024-05-06

## 2024-05-06 RX ORDER — LIRAGLUTIDE 6 MG/ML
18 INJECTION, SOLUTION SUBCUTANEOUS
Qty: 1 | Refills: 3 | Status: DISCONTINUED | COMMUNITY
Start: 2018-05-11 | End: 2024-05-06

## 2024-05-07 DIAGNOSIS — E66.9 OBESITY, UNSPECIFIED: ICD-10-CM

## 2024-05-07 DIAGNOSIS — E78.5 HYPERLIPIDEMIA, UNSPECIFIED: ICD-10-CM

## 2024-05-27 NOTE — DATA REVIEWED
[de-identified] : AD: Hearing WNL .25-3kHz and at 8kHz with a mild SNHL notch 4-6kHz. Excellent WRS and type A tymp. AS: Hearing WNL .25-8kHz with excellent WRS and type A tymp.

## 2024-05-27 NOTE — HISTORY OF PRESENT ILLNESS
[de-identified] : 63 yo F had vertigo episode lasted a few hours, facial fullness on right side of face and decreased hearing and tinnitus AD. Symptoms resolved within the day except for heavy/"fullness sensation on right side of face. Went to Dr. Castro - audiogram done - CT - fluid in sinuses. In the last year, has increased sensitivity AD to loud noises. No tinnitus, otalgia, otorrhea, ear infections, or headaches related to hearing. Hx of Hashimoto's. No hx of head trauma or exposure to loud noises. Son with [facility]. No meds given - nose was clogged - left eyebrow hurt - had CT sinus - right aural fullness - vertigo has resolved -

## 2024-07-22 ENCOUNTER — APPOINTMENT (OUTPATIENT)
Dept: ENDOCRINOLOGY | Facility: CLINIC | Age: 64
End: 2024-07-22
Payer: COMMERCIAL

## 2024-07-22 VITALS
SYSTOLIC BLOOD PRESSURE: 130 MMHG | WEIGHT: 158 LBS | OXYGEN SATURATION: 98 % | DIASTOLIC BLOOD PRESSURE: 75 MMHG | BODY MASS INDEX: 31.85 KG/M2 | HEIGHT: 59 IN | HEART RATE: 82 BPM

## 2024-07-22 DIAGNOSIS — E06.3 AUTOIMMUNE THYROIDITIS: ICD-10-CM

## 2024-07-22 DIAGNOSIS — E55.9 VITAMIN D DEFICIENCY, UNSPECIFIED: ICD-10-CM

## 2024-07-22 DIAGNOSIS — E78.49 OTHER HYPERLIPIDEMIA: ICD-10-CM

## 2024-07-22 DIAGNOSIS — D89.89 OTHER SPECIFIED DISORDERS INVOLVING THE IMMUNE MECHANISM, NOT ELSEWHERE CLASSIFIED: ICD-10-CM

## 2024-07-22 DIAGNOSIS — E66.9 OBESITY, UNSPECIFIED: ICD-10-CM

## 2024-07-22 DIAGNOSIS — R79.89 OTHER SPECIFIED ABNORMAL FINDINGS OF BLOOD CHEMISTRY: ICD-10-CM

## 2024-07-22 DIAGNOSIS — E78.5 HYPERLIPIDEMIA, UNSPECIFIED: ICD-10-CM

## 2024-07-22 DIAGNOSIS — E04.1 NONTOXIC SINGLE THYROID NODULE: ICD-10-CM

## 2024-07-22 PROBLEM — R39.15 URGENCY OF MICTURITION: Status: ACTIVE | Noted: 2024-07-22

## 2024-07-22 PROCEDURE — 36415 COLL VENOUS BLD VENIPUNCTURE: CPT

## 2024-07-22 PROCEDURE — 99214 OFFICE O/P EST MOD 30 MIN: CPT | Mod: 25

## 2024-07-22 RX ORDER — SEMAGLUTIDE 0.25 MG/.5ML
0.25 INJECTION, SOLUTION SUBCUTANEOUS
Qty: 1 | Refills: 1 | Status: ACTIVE | COMMUNITY
Start: 2024-07-22 | End: 1900-01-01

## 2024-07-22 NOTE — ASSESSMENT
[Carbohydrate Consistent Diet] : carbohydrate consistent diet [Importance of Diet and Exercise] : importance of diet and exercise to improve glycemic control, achieve weight loss and improve cardiovascular health [Denosumab Therapy] : Risks  and benefits of denosumab therapy were discussed with the patient including eczema, cellulitis, osteonecrosis of the jaw and atypical femur fractures [Levothyroxine] : The patient was instructed to take Levothyroxine on an empty stomach, separate from vitamins, and wait at least 30 minutes before eating [FreeTextEntry1] : 1) Hashimoto's - Patient with a history of Hashimoto's currently off levothyroxine 25 ug. Since TFTs normal she will remain off. .to repeat labs. Has thyroid nodules repeat one year; largest 1.1 cm non suspicious. .  Recently repeated thyroid ultrasound was stable..  2) osteoporosis - Her BD 3/2019 showed significant osteoporosis T score - 4.5 L 4  , high risk for fracture. .Labs were reviewed and were normal. recommended in past  Forteo due to significant osteoporosis. Was  approved however she did not start. Also discussed Evenity which would be a good option followed by BSP or Prolia. recent BD is fairly stable She had agreed to start Prolia has received  her fourth injection.  Encourage weight bearing exercise vitamin D and calcium supplement.  She is noting some increase in arthritic symptoms. 3) Elevated kappa level - minimal elevation however her GI doctor mentioned to her ,  her spleen was enlarged; she will make appointment with hematologist/oncologist. Bring copy of report.  4 + JONNY to see rheumatologist.  5) Obesity - She was doing great on low dose Saxenda so she will consider  resuming.  She did take a few injections of the lowest dose of Saxenda will now try a weekly so Wegovy was ordered. 6) Hyperlipidemia with moderate calcium score on statin and asp.   f/u 3 mos  labs done

## 2024-07-22 NOTE — HISTORY OF PRESENT ILLNESS
[FreeTextEntry1] : Patient with a history of Hashimoto's thyroid nodules.  Her main problem has to do with weight gain and fatigue. She has tried many diets and does not seem to lose weight and keep it off. She has been off Synthroid  a few years. . She recently resumed Saxenda but stopped while having some medical issues recently. .She is on medication for high cholesterol. She does notice some intermittent hoarseness.  She has a + JONNY saw a  rheumatologist in the past..   She has  osteoporosis. She was suppose to have started Forteo but worried to start during the pandemic. she rarely goes out. . She has  seen heme/Onc yet for elevated kappa level and DX MGUS. Followed by heme.  Has noted ecchymoses on her arms so recently saw the hematologist who did some lab works recently which are pending.. .   She recently has been experiencing pain in her right knee and was found to have a bakers cyst. Also feel more swollen and was told has lymphedema. May need bilateral knee replacements.  She did have the Covid vaccine. Had +  cardiac calcium score was told to take aspirin and stay on statin.  Having dental problems so held off starting Prolia but did get her first injection 6 mos.  ago without any side effects.  She does feel more achy in general..

## 2024-07-22 NOTE — PHYSICAL EXAM
[Alert] : alert [No Acute Distress] : no acute distress [Normal Voice/Communication] : normal voice communication [Normal Sclera/Conjunctiva] : normal sclera/conjunctiva [EOMI] : extra ocular movement intact [Normal Outer Ear/Nose] : the ears and nose were normal in appearance [Thyroid Not Enlarged] : the thyroid was not enlarged [No Thyroid Nodules] : no palpable thyroid nodules [No Respiratory Distress] : no respiratory distress [Clear to Auscultation] : lungs were clear to auscultation bilaterally [Normal PMI] : the apical impulse was normal [Normal Rate] : heart rate was normal [Regular Rhythm] : with a regular rhythm [No Edema] : no peripheral edema [Normal Appearance] : normal in appearance [Gynecomastia] : gynecomastia present [Normal Bowel Sounds] : normal bowel sounds [Soft] : abdomen soft [Normal Gait] : normal gait [No Joint Swelling] : no joint swelling seen [No Rash] : no rash [Normal Reflexes] : deep tendon reflexes were 2+ and symmetric [No Tremors] : no tremors [Oriented x3] : oriented to person, place, and time [Normal Insight/Judgement] : insight and judgment were intact [de-identified] : fullness around right knee and thighs seem sarmiento

## 2024-07-23 LAB
ERYTHROCYTE [SEDIMENTATION RATE] IN BLOOD BY WESTERGREN METHOD: 9 MM/HR
ESTIMATED AVERAGE GLUCOSE: 100 MG/DL
FREE KAPPA URINE: 70.62 MG/L
FREE KAPPA/LAMDA RATIO: 7.07 RATIO
FREE LAMDA URINE: 9.99 MG/L
HBA1C MFR BLD HPLC: 5.1 %

## 2024-07-24 LAB
25(OH)D3 SERPL-MCNC: 36.7 NG/ML
ALBUMIN SERPL ELPH-MCNC: 4.7 G/DL
ALP BLD-CCNC: 66 U/L
ALT SERPL-CCNC: 15 U/L
ANION GAP SERPL CALC-SCNC: 16 MMOL/L
AST SERPL-CCNC: 17 U/L
BILIRUB SERPL-MCNC: 0.4 MG/DL
BUN SERPL-MCNC: 17 MG/DL
CALCIUM SERPL-MCNC: 9.6 MG/DL
CALCIUM SERPL-MCNC: 9.6 MG/DL
CHLORIDE SERPL-SCNC: 103 MMOL/L
CHOLEST SERPL-MCNC: 170 MG/DL
CO2 SERPL-SCNC: 22 MMOL/L
CREAT SERPL-MCNC: 0.97 MG/DL
EGFR: 66 ML/MIN/1.73M2
GLUCOSE SERPL-MCNC: 94 MG/DL
HCYS SERPL-MCNC: 29.6 UMOL/L
HDLC SERPL-MCNC: 56 MG/DL
LDLC SERPL CALC-MCNC: 88 MG/DL
NONHDLC SERPL-MCNC: 114 MG/DL
PARATHYROID HORMONE INTACT: 62 PG/ML
POTASSIUM SERPL-SCNC: 4.3 MMOL/L
PROT SERPL-MCNC: 7.7 G/DL
SODIUM SERPL-SCNC: 140 MMOL/L
T4 FREE SERPL-MCNC: 1.4 NG/DL
T4 SERPL-MCNC: 11.4 UG/DL
TRIGL SERPL-MCNC: 150 MG/DL
TSH SERPL-ACNC: 2.21 UIU/ML

## 2024-07-25 PROBLEM — Z87.440 HISTORY OF UTI: Status: ACTIVE | Noted: 2024-07-25

## 2024-07-25 LAB
ALBUMIN MFR SERPL ELPH: 59.4 %
ALBUMIN SERPL-MCNC: 4.4 G/DL
ALBUMIN/GLOB SERPL: 1.5 RATIO
ALPHA1 GLOB MFR SERPL ELPH: 4.2 %
ALPHA1 GLOB SERPL ELPH-MCNC: 0.3 G/DL
ALPHA2 GLOB MFR SERPL ELPH: 9 %
ALPHA2 GLOB SERPL ELPH-MCNC: 0.7 G/DL
ANA PAT FLD IF-IMP: ABNORMAL
ANA SER IF-ACNC: ABNORMAL
B-GLOBULIN MFR SERPL ELPH: 11.6 %
B-GLOBULIN SERPL ELPH-MCNC: 0.9 G/DL
DEPRECATED KAPPA LC FREE/LAMBDA SER: 1.64 RATIO
GAMMA GLOB FLD ELPH-MCNC: 1.2 G/DL
GAMMA GLOB MFR SERPL ELPH: 15.8 %
IGA SER QL IEP: 168 MG/DL
IGG SER QL IEP: 1178 MG/DL
IGM SER QL IEP: 100 MG/DL
INTERPRETATION SERPL IEP-IMP: NORMAL
KAPPA LC CSF-MCNC: 1.36 MG/DL
KAPPA LC SERPL-MCNC: 2.23 MG/DL
M PROTEIN MFR SERPL ELPH: NORMAL
M PROTEIN SPEC IFE-MCNC: NORMAL
MONOCLON BAND OBS SERPL: NORMAL
PROPEPTIDE TYPE I COLLAGEN: 13.8 NG/ML
PROT SERPL-MCNC: 7.4 G/DL
PROT SERPL-MCNC: 7.4 G/DL

## 2024-07-26 ENCOUNTER — LABORATORY RESULT (OUTPATIENT)
Age: 64
End: 2024-07-26

## 2024-07-28 LAB
APPEARANCE: ABNORMAL
BILIRUBIN URINE: ABNORMAL
BLOOD URINE: NEGATIVE
COLOR: NORMAL
GLUCOSE QUALITATIVE U: NEGATIVE MG/DL
KETONES URINE: NEGATIVE MG/DL
LEUKOCYTE ESTERASE URINE: ABNORMAL
NITRITE URINE: NEGATIVE
PH URINE: 8
PROTEIN URINE: 30 MG/DL
SPECIFIC GRAVITY URINE: 1.03
UROBILINOGEN URINE: 1 MG/DL

## 2024-07-30 ENCOUNTER — NON-APPOINTMENT (OUTPATIENT)
Age: 64
End: 2024-07-30

## 2024-07-30 DIAGNOSIS — M81.0 AGE-RELATED OSTEOPOROSIS W/OUT CURRENT PATHOLOGICAL FRACTURE: ICD-10-CM

## 2024-07-30 DIAGNOSIS — Z87.440 PERSONAL HISTORY OF URINARY (TRACT) INFECTIONS: ICD-10-CM

## 2024-07-30 DIAGNOSIS — R39.15 URGENCY OF URINATION: ICD-10-CM

## 2024-08-06 ENCOUNTER — RX CHANGE (OUTPATIENT)
Age: 64
End: 2024-08-06

## 2024-08-13 ENCOUNTER — NON-APPOINTMENT (OUTPATIENT)
Age: 64
End: 2024-08-13

## 2024-08-14 ENCOUNTER — APPOINTMENT (OUTPATIENT)
Dept: OTOLARYNGOLOGY | Facility: CLINIC | Age: 64
End: 2024-08-14

## 2024-08-14 VITALS
HEART RATE: 88 BPM | HEIGHT: 59 IN | DIASTOLIC BLOOD PRESSURE: 81 MMHG | SYSTOLIC BLOOD PRESSURE: 144 MMHG | BODY MASS INDEX: 31.25 KG/M2 | WEIGHT: 155 LBS

## 2024-08-14 PROCEDURE — 92557 COMPREHENSIVE HEARING TEST: CPT

## 2024-08-14 PROCEDURE — 99213 OFFICE O/P EST LOW 20 MIN: CPT

## 2024-08-14 PROCEDURE — 92567 TYMPANOMETRY: CPT

## 2024-08-14 NOTE — PHYSICAL EXAM
[Midline] : trachea located in midline position [Normal] : no nystagmus [] : [place]-Hallpike test is negative

## 2024-08-14 NOTE — HISTORY OF PRESENT ILLNESS
[de-identified] : 64 year old female presents for follow up evaluation. Last seen in May - prescribed PO abx and steroids for sinusitis related vertigo. Reports improvement after completion of regimen. States had "room spinning" dizziness this past Saturday, with associated nausea. Took Dramamine with relief. Felt blocked AS with nausea - lasted 2 hours - no headaches.

## 2024-08-22 LAB — BACTERIA UR CULT: NORMAL

## 2024-09-16 ENCOUNTER — RX RENEWAL (OUTPATIENT)
Age: 64
End: 2024-09-16

## 2024-09-17 ENCOUNTER — RX RENEWAL (OUTPATIENT)
Age: 64
End: 2024-09-17

## 2024-10-29 ENCOUNTER — APPOINTMENT (OUTPATIENT)
Dept: ENDOCRINOLOGY | Facility: CLINIC | Age: 64
End: 2024-10-29

## 2024-10-29 ENCOUNTER — APPOINTMENT (OUTPATIENT)
Dept: OBGYN | Facility: CLINIC | Age: 64
End: 2024-10-29

## 2024-11-08 ENCOUNTER — RX RENEWAL (OUTPATIENT)
Age: 64
End: 2024-11-08

## 2024-11-18 ENCOUNTER — MED ADMIN CHARGE (OUTPATIENT)
Age: 64
End: 2024-11-18

## 2024-11-18 ENCOUNTER — APPOINTMENT (OUTPATIENT)
Dept: ENDOCRINOLOGY | Facility: CLINIC | Age: 64
End: 2024-11-18

## 2024-11-18 PROCEDURE — 96401 CHEMO ANTI-NEOPL SQ/IM: CPT

## 2024-11-18 RX ORDER — DENOSUMAB 60 MG/ML
60 INJECTION SUBCUTANEOUS
Qty: 1 | Refills: 0 | Status: COMPLETED | OUTPATIENT
Start: 2024-11-18

## 2024-12-26 ENCOUNTER — LABORATORY RESULT (OUTPATIENT)
Age: 64
End: 2024-12-26

## 2024-12-26 ENCOUNTER — APPOINTMENT (OUTPATIENT)
Dept: ENDOCRINOLOGY | Facility: CLINIC | Age: 64
End: 2024-12-26
Payer: COMMERCIAL

## 2024-12-26 VITALS
HEART RATE: 88 BPM | SYSTOLIC BLOOD PRESSURE: 135 MMHG | HEIGHT: 59 IN | WEIGHT: 154 LBS | BODY MASS INDEX: 31.04 KG/M2 | OXYGEN SATURATION: 99 % | DIASTOLIC BLOOD PRESSURE: 81 MMHG

## 2024-12-26 DIAGNOSIS — D89.89 OTHER SPECIFIED DISORDERS INVOLVING THE IMMUNE MECHANISM, NOT ELSEWHERE CLASSIFIED: ICD-10-CM

## 2024-12-26 DIAGNOSIS — I10 ESSENTIAL (PRIMARY) HYPERTENSION: ICD-10-CM

## 2024-12-26 DIAGNOSIS — R79.89 OTHER SPECIFIED ABNORMAL FINDINGS OF BLOOD CHEMISTRY: ICD-10-CM

## 2024-12-26 DIAGNOSIS — E66.9 OBESITY, UNSPECIFIED: ICD-10-CM

## 2024-12-26 DIAGNOSIS — E53.8 DEFICIENCY OF OTHER SPECIFIED B GROUP VITAMINS: ICD-10-CM

## 2024-12-26 DIAGNOSIS — R53.83 OTHER FATIGUE: ICD-10-CM

## 2024-12-26 DIAGNOSIS — E04.1 NONTOXIC SINGLE THYROID NODULE: ICD-10-CM

## 2024-12-26 DIAGNOSIS — R73.9 HYPERGLYCEMIA, UNSPECIFIED: ICD-10-CM

## 2024-12-26 DIAGNOSIS — M81.0 AGE-RELATED OSTEOPOROSIS W/OUT CURRENT PATHOLOGICAL FRACTURE: ICD-10-CM

## 2024-12-26 DIAGNOSIS — E55.9 VITAMIN D DEFICIENCY, UNSPECIFIED: ICD-10-CM

## 2024-12-26 PROCEDURE — 99214 OFFICE O/P EST MOD 30 MIN: CPT

## 2024-12-26 PROCEDURE — 36415 COLL VENOUS BLD VENIPUNCTURE: CPT

## 2024-12-30 LAB
25(OH)D3 SERPL-MCNC: 31.5 NG/ML
ALBUMIN SERPL ELPH-MCNC: 4.5 G/DL
ALP BLD-CCNC: 72 U/L
ALT SERPL-CCNC: 14 U/L
ANION GAP SERPL CALC-SCNC: 12 MMOL/L
APPEARANCE: ABNORMAL
AST SERPL-CCNC: 15 U/L
BASOPHILS # BLD AUTO: 0.05 K/UL
BASOPHILS NFR BLD AUTO: 0.8 %
BILIRUB SERPL-MCNC: 0.5 MG/DL
BILIRUBIN URINE: ABNORMAL
BLOOD URINE: ABNORMAL
BUN SERPL-MCNC: 19 MG/DL
CALCIUM SERPL-MCNC: 8.7 MG/DL
CHLORIDE SERPL-SCNC: 103 MMOL/L
CHOLEST SERPL-MCNC: 178 MG/DL
CO2 SERPL-SCNC: 22 MMOL/L
COLOR: NORMAL
CREAT SERPL-MCNC: 0.97 MG/DL
EGFR: 66 ML/MIN/1.73M2
EOSINOPHIL # BLD AUTO: 0.08 K/UL
EOSINOPHIL NFR BLD AUTO: 1.2 %
ESTIMATED AVERAGE GLUCOSE: 100 MG/DL
FERRITIN SERPL-MCNC: 38 NG/ML
FOLATE SERPL-MCNC: 2.9 NG/ML
FREE KAPPA URINE: 32.76 MG/L
FREE KAPPA/LAMDA RATIO: 4.73 RATIO
FREE LAMDA URINE: 6.93 MG/L
GLUCOSE QUALITATIVE U: NEGATIVE MG/DL
GLUCOSE SERPL-MCNC: 122 MG/DL
HBA1C MFR BLD HPLC: 5.1 %
HCT VFR BLD CALC: 44.5 %
HDLC SERPL-MCNC: 62 MG/DL
HGB BLD-MCNC: 14.8 G/DL
IMM GRANULOCYTES NFR BLD AUTO: 0.5 %
IRON SATN MFR SERPL: 18 %
IRON SERPL-MCNC: 61 UG/DL
KETONES URINE: ABNORMAL MG/DL
LDLC SERPL CALC-MCNC: 97 MG/DL
LEUKOCYTE ESTERASE URINE: ABNORMAL
LYMPHOCYTES # BLD AUTO: 1.31 K/UL
LYMPHOCYTES NFR BLD AUTO: 20.2 %
MAN DIFF?: NORMAL
MCHC RBC-ENTMCNC: 30.6 PG
MCHC RBC-ENTMCNC: 33.3 G/DL
MCV RBC AUTO: 92.1 FL
MONOCYTES # BLD AUTO: 0.48 K/UL
MONOCYTES NFR BLD AUTO: 7.4 %
NEUTROPHILS # BLD AUTO: 4.52 K/UL
NEUTROPHILS NFR BLD AUTO: 69.9 %
NITRITE URINE: NEGATIVE
NONHDLC SERPL-MCNC: 116 MG/DL
PH URINE: 5.5
PLATELET # BLD AUTO: 188 K/UL
POTASSIUM SERPL-SCNC: 4.3 MMOL/L
PROT SERPL-MCNC: 7.3 G/DL
PROTEIN URINE: 30 MG/DL
RBC # BLD: 4.83 M/UL
RBC # FLD: 13.9 %
SODIUM SERPL-SCNC: 138 MMOL/L
SPECIFIC GRAVITY URINE: >1.03
T4 FREE SERPL-MCNC: 1.1 NG/DL
T4 SERPL-MCNC: 9.8 UG/DL
TIBC SERPL-MCNC: 333 UG/DL
TRIGL SERPL-MCNC: 109 MG/DL
TSH SERPL-ACNC: 3.87 UIU/ML
UIBC SERPL-MCNC: 273 UG/DL
UROBILINOGEN URINE: 1 MG/DL
VIT B12 SERPL-MCNC: 258 PG/ML
WBC # FLD AUTO: 6.47 K/UL

## 2025-01-02 ENCOUNTER — NON-APPOINTMENT (OUTPATIENT)
Age: 65
End: 2025-01-02

## 2025-01-02 LAB
ALBUMIN MFR SERPL ELPH: 59.5 %
ALBUMIN SERPL-MCNC: 4.3 G/DL
ALBUMIN/GLOB SERPL: 1.5 RATIO
ALPHA1 GLOB MFR SERPL ELPH: 4.2 %
ALPHA1 GLOB SERPL ELPH-MCNC: 0.3 G/DL
ALPHA2 GLOB MFR SERPL ELPH: 9.2 %
ALPHA2 GLOB SERPL ELPH-MCNC: 0.7 G/DL
B-GLOBULIN MFR SERPL ELPH: 11.2 %
B-GLOBULIN SERPL ELPH-MCNC: 0.8 G/DL
COLLAGEN CTX SERPL-MCNC: 51 PG/ML
DEPRECATED KAPPA LC FREE/LAMBDA SER: 2.01 RATIO
GAMMA GLOB FLD ELPH-MCNC: 1.1 G/DL
GAMMA GLOB MFR SERPL ELPH: 15.9 %
IGA SER QL IEP: 156 MG/DL
IGG SER QL IEP: 1150 MG/DL
IGM SER QL IEP: 96 MG/DL
INTERPRETATION SERPL IEP-IMP: NORMAL
KAPPA LC CSF-MCNC: 1.14 MG/DL
KAPPA LC SERPL-MCNC: 2.29 MG/DL
M PROTEIN SPEC IFE-MCNC: NORMAL
PROPEPTIDE TYPE I COLLAGEN: 33.7 NG/ML
PROT SERPL-MCNC: 7.2 G/DL
PROT SERPL-MCNC: 7.2 G/DL

## 2025-01-09 ENCOUNTER — RX RENEWAL (OUTPATIENT)
Age: 65
End: 2025-01-09

## 2025-01-13 ENCOUNTER — RX RENEWAL (OUTPATIENT)
Age: 65
End: 2025-01-13

## 2025-01-15 RX ORDER — SEMAGLUTIDE 0.68 MG/ML
2 INJECTION, SOLUTION SUBCUTANEOUS
Qty: 1 | Refills: 2 | Status: ACTIVE | COMMUNITY
Start: 2025-01-09 | End: 1900-01-01

## 2025-01-15 RX ORDER — SEMAGLUTIDE 0.68 MG/ML
2 INJECTION, SOLUTION SUBCUTANEOUS
Qty: 2 | Refills: 1 | Status: ACTIVE | COMMUNITY
Start: 2025-01-13

## 2025-03-11 ENCOUNTER — RX RENEWAL (OUTPATIENT)
Age: 65
End: 2025-03-11

## 2025-05-05 ENCOUNTER — RX RENEWAL (OUTPATIENT)
Age: 65
End: 2025-05-05